# Patient Record
Sex: FEMALE | Race: WHITE | NOT HISPANIC OR LATINO | Employment: PART TIME | URBAN - METROPOLITAN AREA
[De-identification: names, ages, dates, MRNs, and addresses within clinical notes are randomized per-mention and may not be internally consistent; named-entity substitution may affect disease eponyms.]

---

## 2017-09-05 ENCOUNTER — OFFICE VISIT (OUTPATIENT)
Dept: OTOLARYNGOLOGY | Facility: CLINIC | Age: 27
End: 2017-09-05
Payer: COMMERCIAL

## 2017-09-05 VITALS
DIASTOLIC BLOOD PRESSURE: 81 MMHG | SYSTOLIC BLOOD PRESSURE: 125 MMHG | HEART RATE: 81 BPM | WEIGHT: 143.5 LBS | TEMPERATURE: 99 F

## 2017-09-05 DIAGNOSIS — J01.00 ACUTE NON-RECURRENT MAXILLARY SINUSITIS: Primary | ICD-10-CM

## 2017-09-05 PROCEDURE — 99999 PR PBB SHADOW E&M-NEW PATIENT-LVL III: CPT | Mod: PBBFAC,,, | Performed by: PHYSICIAN ASSISTANT

## 2017-09-05 PROCEDURE — 99203 OFFICE O/P NEW LOW 30 MIN: CPT | Mod: S$GLB,,, | Performed by: PHYSICIAN ASSISTANT

## 2017-09-05 RX ORDER — CEFDINIR 300 MG/1
300 CAPSULE ORAL 2 TIMES DAILY
Qty: 20 CAPSULE | Refills: 0 | Status: SHIPPED | OUTPATIENT
Start: 2017-09-05 | End: 2017-09-15

## 2017-09-05 RX ORDER — DOXYCYCLINE HYCLATE 100 MG/1
100 TABLET, DELAYED RELEASE ORAL 2 TIMES DAILY
COMMUNITY
End: 2017-10-20 | Stop reason: ALTCHOICE

## 2017-09-05 RX ORDER — FLUTICASONE PROPIONATE 50 MCG
2 SPRAY, SUSPENSION (ML) NASAL DAILY
COMMUNITY
End: 2018-06-22

## 2017-09-05 NOTE — PROGRESS NOTES
Subjective:       Patient ID: Winter Sood is a 27 y.o. female.    Chief Complaint: Sinus Problem    Patient is a very pleasant 27 year old female here to see me today for the first time for evaluation of sinus issues.  She reports one week of worsening right facial pain and pressure in her cheek.  She had a CT at her place of employment on Friday and was told her right maxillary sinus was full of fluid.  She's been on Doxycycline since that time with no improvement.  Today her right eye has watery drainage.  She's not having much nasal drainage; she tried Netipot last night with no results.  She denies change in sense of smell.  She's having headaches; gets some relief with Advil Sinus and she's been on Flonase for about 5 days.  She does not smoke.  Denies previous sinus surgery.  She usually gets one sinus infection a year.  She had fever today.      Review of Systems   Constitutional: Positive for fatigue and fever. Negative for activity change and appetite change.   HENT: Positive for congestion, sinus pain and sinus pressure. Negative for ear discharge, ear pain, hearing loss, nosebleeds, postnasal drip, rhinorrhea, sneezing, sore throat, tinnitus and trouble swallowing.    Eyes: Positive for discharge (watery, right eye).   Respiratory: Negative for cough, shortness of breath and wheezing.    Cardiovascular: Negative for chest pain and palpitations.   Gastrointestinal: Negative for diarrhea, nausea and vomiting.   Musculoskeletal: Negative for neck pain.   Allergic/Immunologic: Negative for environmental allergies and food allergies.   Neurological: Positive for headaches. Negative for dizziness and light-headedness.   Hematological: Negative for adenopathy.   Psychiatric/Behavioral: Negative for sleep disturbance.       Objective:      Physical Exam   Constitutional: She is oriented to person, place, and time. She appears well-developed and well-nourished. She is cooperative. No distress.   HENT:    Head: Normocephalic and atraumatic.   Right Ear: Hearing, tympanic membrane, external ear and ear canal normal. No middle ear effusion.   Left Ear: Hearing, tympanic membrane, external ear and ear canal normal.  No middle ear effusion.   Nose: Mucosal edema present. No rhinorrhea, nasal deformity or septal deviation. No epistaxis. Right sinus exhibits maxillary sinus tenderness and frontal sinus tenderness. Left sinus exhibits frontal sinus tenderness. Left sinus exhibits no maxillary sinus tenderness.   Mouth/Throat: Uvula is midline, oropharynx is clear and moist and mucous membranes are normal. Mucous membranes are not pale and not dry. No trismus in the jaw. Normal dentition. No uvula swelling. No oropharyngeal exudate or posterior oropharyngeal erythema. Tonsils are 2+ on the right. Tonsils are 2+ on the left. No tonsillar exudate.   Eyes: Conjunctivae, EOM and lids are normal. Pupils are equal, round, and reactive to light. Right eye exhibits no chemosis. Left eye exhibits no chemosis. Right conjunctiva is not injected. Left conjunctiva is not injected. No scleral icterus. Right eye exhibits normal extraocular motion and no nystagmus. Left eye exhibits normal extraocular motion and no nystagmus.   Neck: Trachea normal and phonation normal. No tracheal tenderness present. No tracheal deviation present. No thyroid mass and no thyromegaly present.   Cardiovascular: Intact distal pulses.    Pulmonary/Chest: Effort normal. No stridor. No respiratory distress.   Abdominal: She exhibits no distension.   Lymphadenopathy:        Head (right side): No submental, no submandibular, no preauricular and no posterior auricular adenopathy present.        Head (left side): No submental, no submandibular, no preauricular and no posterior auricular adenopathy present.     She has no cervical adenopathy.   Neurological: She is alert and oriented to person, place, and time. No cranial nerve deficit.   Skin: Skin is warm and dry.  No rash noted. No erythema.   Psychiatric: She has a normal mood and affect. Her behavior is normal.       Assessment:       1. Acute non-recurrent maxillary sinusitis        Plan:         Recommend Omnicef x 10 days.  Recommend she continue with Flonase daily; can add Mucinex BID and plain saline spray throughout the day.  RTC if no improvement after completing antibiotics.

## 2017-10-20 ENCOUNTER — OFFICE VISIT (OUTPATIENT)
Dept: OTOLARYNGOLOGY | Facility: CLINIC | Age: 27
End: 2017-10-20
Payer: COMMERCIAL

## 2017-10-20 VITALS
TEMPERATURE: 98 F | BODY MASS INDEX: 26.05 KG/M2 | HEIGHT: 62 IN | DIASTOLIC BLOOD PRESSURE: 82 MMHG | SYSTOLIC BLOOD PRESSURE: 127 MMHG | HEART RATE: 90 BPM | WEIGHT: 141.56 LBS

## 2017-10-20 DIAGNOSIS — J32.9 RECURRENT SINUSITIS: Primary | ICD-10-CM

## 2017-10-20 PROCEDURE — 99999 PR PBB SHADOW E&M-EST. PATIENT-LVL III: CPT | Mod: PBBFAC,,, | Performed by: PHYSICIAN ASSISTANT

## 2017-10-20 PROCEDURE — 99214 OFFICE O/P EST MOD 30 MIN: CPT | Mod: S$GLB,,, | Performed by: PHYSICIAN ASSISTANT

## 2017-10-20 RX ORDER — METRONIDAZOLE 500 MG/1
500 TABLET ORAL EVERY 8 HOURS
Qty: 42 TABLET | Refills: 0 | Status: SHIPPED | OUTPATIENT
Start: 2017-10-20 | End: 2017-11-03

## 2017-10-20 RX ORDER — LEVOFLOXACIN 500 MG/1
500 TABLET, FILM COATED ORAL DAILY
Qty: 21 TABLET | Refills: 0 | Status: SHIPPED | OUTPATIENT
Start: 2017-10-20 | End: 2017-11-10

## 2017-10-20 RX ORDER — PREDNISONE 20 MG/1
TABLET ORAL
Qty: 10 TABLET | Refills: 0 | Status: SHIPPED | OUTPATIENT
Start: 2017-10-20 | End: 2017-11-13 | Stop reason: ALTCHOICE

## 2017-10-20 NOTE — Clinical Note
Please call her to schedule CT sinus in 3 weeks (after finishes antibiotics) and then RTC with Dr. Manning to review CT

## 2017-10-20 NOTE — PROGRESS NOTES
Subjective:       Patient ID: Winter Sood is a 27 y.o. female.    Chief Complaint: Sinus Problem (fluid in sinuses)    Patient is a very pleasant 27 year old female here to see me today for evaluation of recurrent sinus issues.  She was first here with me last month with one week of worsening right facial pain and pressure in her cheek.  She had a CT at her place of employment and was told her right maxillary sinus was full of fluid.  She had been treated with Doxycycline when I saw her last.  She was then treated with Omnicef and later that week had a steroid injection.  She reports some improvement, but not sustained.  She again has two days of right facial pain and pressure; her teeth are hurting on the right and she has some ear pressure.  She's continued to use Flonase daily.  She's not tried Netipot again as it caused right eye to be watery for awhile afterwards.  She's not having much nasal drainage or postnasal drip.  She denies change in sense of smell.  She's continued to have headaches, almost daily;  gets some relief with Advil Sinus.  She does not smoke.  Denies previous sinus surgery.  She usually gets one sinus infection a year.  No recent fever.  She usually does not suffer with allergies - denies itchy eyes, congestion, or frequent sneezing.  She had another CT at her place of employment and is able to show me the image on her phone.  Unfortunately, the image was not saved in their system and she's unable to get a copy on CD for me.      Review of Systems   Constitutional: Positive for fatigue. Negative for activity change, appetite change and fever.   HENT: Positive for congestion, dental problem (right upper teeth hurt), ear pain (left this week), sinus pain and sinus pressure. Negative for ear discharge, hearing loss, nosebleeds, postnasal drip, rhinorrhea, sneezing, sore throat, tinnitus and trouble swallowing.    Eyes: Negative for discharge.   Respiratory: Negative for cough,  shortness of breath and wheezing.    Cardiovascular: Negative for chest pain and palpitations.   Gastrointestinal: Negative for diarrhea, nausea and vomiting.   Allergic/Immunologic: Negative for environmental allergies and food allergies.   Neurological: Positive for headaches. Negative for dizziness and light-headedness.   Hematological: Negative for adenopathy.   Psychiatric/Behavioral: Negative for sleep disturbance.       Objective:      Physical Exam   Constitutional: She is oriented to person, place, and time. She appears well-developed and well-nourished. She is cooperative. No distress.   HENT:   Head: Normocephalic and atraumatic.   Right Ear: Hearing, tympanic membrane, external ear and ear canal normal. No middle ear effusion.   Left Ear: Hearing, tympanic membrane, external ear and ear canal normal.  No middle ear effusion.   Nose: Mucosal edema present. No rhinorrhea, nasal deformity or septal deviation. No epistaxis. Right sinus exhibits maxillary sinus tenderness and frontal sinus tenderness. Left sinus exhibits frontal sinus tenderness. Left sinus exhibits no maxillary sinus tenderness.   Mouth/Throat: Uvula is midline, oropharynx is clear and moist and mucous membranes are normal. Mucous membranes are not pale and not dry. No trismus in the jaw. Normal dentition. No uvula swelling. No oropharyngeal exudate or posterior oropharyngeal erythema. Tonsils are 2+ on the right. Tonsils are 2+ on the left. No tonsillar exudate.   Eyes: Conjunctivae, EOM and lids are normal. Pupils are equal, round, and reactive to light. Right eye exhibits no chemosis. Left eye exhibits no chemosis. Right conjunctiva is not injected. Left conjunctiva is not injected. No scleral icterus. Right eye exhibits normal extraocular motion and no nystagmus. Left eye exhibits normal extraocular motion and no nystagmus.   Neck: Trachea normal and phonation normal. No tracheal tenderness present. No tracheal deviation present. No  thyroid mass and no thyromegaly present.   Cardiovascular: Intact distal pulses.    Pulmonary/Chest: Effort normal. No stridor. No respiratory distress.   Abdominal: She exhibits no distension.   Lymphadenopathy:        Head (right side): No submental, no submandibular, no preauricular and no posterior auricular adenopathy present.        Head (left side): No submental, no submandibular, no preauricular and no posterior auricular adenopathy present.     She has no cervical adenopathy.   Neurological: She is alert and oriented to person, place, and time. No cranial nerve deficit.   Skin: Skin is warm and dry. No rash noted. No erythema.   Psychiatric: She has a normal mood and affect. Her behavior is normal.                 Assessment:       1. Recurrent sinusitis        Plan:           Recommend Levaquin x 3 weeks and Flagyl x 2 weeks.  We reviewed risk of tendon rupture associated with Levaquin use.  She's not pregnant and is currently on her menstrual cycle.  Will add Prednisone taper as well.  Discussed risk of steroid use includes elevating blood sugar and difficulty with sleep.  She voiced understanding and agrees with treatment.  She's to continue Flonase daily.  Can start nasal saline spray.  Plan to get CT sinus in 3 weeks after completion of antibiotics.  Instructed her to call if no improvement in 7-10 days or with any worsening symptoms.

## 2017-11-10 ENCOUNTER — TELEPHONE (OUTPATIENT)
Dept: RADIOLOGY | Facility: HOSPITAL | Age: 27
End: 2017-11-10

## 2017-11-13 ENCOUNTER — HOSPITAL ENCOUNTER (OUTPATIENT)
Dept: RADIOLOGY | Facility: HOSPITAL | Age: 27
Discharge: HOME | End: 2017-11-13
Attending: PHYSICIAN ASSISTANT
Payer: COMMERCIAL

## 2017-11-13 ENCOUNTER — OFFICE VISIT (OUTPATIENT)
Dept: OTOLARYNGOLOGY | Facility: CLINIC | Age: 27
End: 2017-11-13
Payer: COMMERCIAL

## 2017-11-13 ENCOUNTER — TELEPHONE (OUTPATIENT)
Dept: OTOLARYNGOLOGY | Facility: CLINIC | Age: 27
End: 2017-11-13

## 2017-11-13 VITALS
DIASTOLIC BLOOD PRESSURE: 77 MMHG | BODY MASS INDEX: 26.05 KG/M2 | SYSTOLIC BLOOD PRESSURE: 115 MMHG | WEIGHT: 142.44 LBS | HEART RATE: 101 BPM | TEMPERATURE: 98 F

## 2017-11-13 DIAGNOSIS — J32.9 RECURRENT SINUSITIS: ICD-10-CM

## 2017-11-13 DIAGNOSIS — J32.0 CHRONIC RIGHT MAXILLARY SINUSITIS: ICD-10-CM

## 2017-11-13 DIAGNOSIS — J32.1 CHRONIC FRONTAL SINUSITIS: Primary | ICD-10-CM

## 2017-11-13 DIAGNOSIS — J32.2 CHRONIC ETHMOIDITIS: ICD-10-CM

## 2017-11-13 DIAGNOSIS — Z01.818 PRE-OP TESTING: ICD-10-CM

## 2017-11-13 PROCEDURE — 70486 CT MAXILLOFACIAL W/O DYE: CPT | Mod: TC,PO

## 2017-11-13 PROCEDURE — 31231 NASAL ENDOSCOPY DX: CPT | Mod: RT,S$GLB,, | Performed by: OTOLARYNGOLOGY

## 2017-11-13 PROCEDURE — 70486 CT MAXILLOFACIAL W/O DYE: CPT | Mod: 26,,, | Performed by: RADIOLOGY

## 2017-11-13 PROCEDURE — 99999 PR PBB SHADOW E&M-EST. PATIENT-LVL III: CPT | Mod: PBBFAC,,, | Performed by: OTOLARYNGOLOGY

## 2017-11-13 PROCEDURE — 99214 OFFICE O/P EST MOD 30 MIN: CPT | Mod: 25,S$GLB,, | Performed by: OTOLARYNGOLOGY

## 2017-11-13 NOTE — TELEPHONE ENCOUNTER
Patient will call to back to pick surgery date. Surgery CPT codes are 97659,[id number]. Consents signed along with instruction package provided to patient. Will needs to schedule labs upon return call back and surgery date selected. Surgery will be Right side only.

## 2017-11-13 NOTE — PROGRESS NOTES
Subjective:       Patient ID: Winter Sood is a 27 y.o. female.    Chief Complaint: Sinus Problem (fluid in sinuses)    Patient is a very pleasant 27 year old female here to see me today for evaluation of recurrent sinus issues.  She was first here with me last month with one week of worsening right facial pain and pressure in her cheek.  She had a CT at her place of employment and was told her right maxillary sinus was full of fluid.  She had been treated with Doxycycline when I saw her last.  She was then treated with Omnicef and later that week had a steroid injection.  She reports some improvement, but not sustained.  She again had  right facial pain and pressure; her teeth are hurting on the right and she has some ear pressure.  She's continued to use Flonase daily.    She's not having much nasal drainage or postnasal drip.  She denies change in sense of smell.  She's continued to have headaches, almost daily;  gets some relief with Advil Sinus.  She does not smoke.  Denies previous sinus surgery.  She usually gets one sinus infection a year.  No recent fever.  She usually does not suffer with allergies - denies itchy eyes, congestion, or frequent sneezing.  She had another CT at her place of employment and is able to show me the image on her phone.  She was treated with 3 weeks of levofloxacin and 2 weeks of flagyl.  She has had decrease in her symptoms but not resolution.  She continues to have congestion and now has drainage on the right.  She has less pain than before.         Review of Systems   Constitutional: Positive for fatigue. Negative for activity change, appetite change and fever.   HENT: Positive for congestion,. Negative for ear discharge, hearing loss, nosebleeds, postnasal drip, rhinorrhea, sneezing, sore throat, tinnitus and trouble swallowing.    Eyes: Negative for discharge.   Respiratory: Negative for cough, shortness of breath and wheezing.    Cardiovascular: Negative for chest  pain and palpitations.   Gastrointestinal: Negative for diarrhea, nausea and vomiting.   Allergic/Immunologic: Negative for environmental allergies and food allergies.   Neurological: Positive for headaches. Negative for dizziness and light-headedness.   Hematological: Negative for adenopathy.   Psychiatric/Behavioral: Negative for sleep disturbance.       Objective:      Physical Exam   Constitutional: She is oriented to person, place, and time. She appears well-developed and well-nourished. She is cooperative. No distress.   HENT:   Head: Normocephalic and atraumatic.   Right Ear: Hearing, tympanic membrane, external ear and ear canal normal. No middle ear effusion.   Left Ear: Hearing, tympanic membrane, external ear and ear canal normal.  No middle ear effusion.   Nose: Mucosal edema present. No rhinorrhea, nasal deformity or septal deviation. No epistaxis. Right sinus exhibits maxillary sinus tenderness and frontal sinus tenderness. Left sinus exhibits frontal sinus tenderness. Left sinus exhibits no maxillary sinus tenderness.   Mouth/Throat: Uvula is midline, oropharynx is clear and moist and mucous membranes are normal. Mucous membranes are not pale and not dry. No trismus in the jaw. Normal dentition. No uvula swelling. No oropharyngeal exudate or posterior oropharyngeal erythema. Tonsils are 2+ on the right. Tonsils are 2+ on the left. No tonsillar exudate.   Eyes: Conjunctivae, EOM and lids are normal. Pupils are equal, round, and reactive to light. Right eye exhibits no chemosis. Left eye exhibits no chemosis. Right conjunctiva is not injected. Left conjunctiva is not injected. No scleral icterus. Right eye exhibits normal extraocular motion and no nystagmus. Left eye exhibits normal extraocular motion and no nystagmus.   Neck: Trachea normal and phonation normal. No tracheal tenderness present. No tracheal deviation present. No thyroid mass and no thyromegaly present.   Cardiovascular: Intact distal  pulses.    Pulmonary/Chest: Effort normal. No stridor. No respiratory distress.   Abdominal: She exhibits no distension.   Lymphadenopathy:        Head (right side): No submental, no submandibular, no preauricular and no posterior auricular adenopathy present.        Head (left side): No submental, no submandibular, no preauricular and no posterior auricular adenopathy present.     She has no cervical adenopathy.   Neurological: She is alert and oriented to person, place, and time. No cranial nerve deficit.   Skin: Skin is warm and dry. No rash noted. No erythema.   Psychiatric: She has a normal mood and affect. Her behavior is normal.     No results found for this or any previous visit.  Results for orders placed during the hospital encounter of 11/13/17   CT BitWine Sinuses without    Narrative Comparisons: None    History: Recurrent RIGHT maxillary sinusitis, chronic sinusitis    Findings: Axial CT of the sinuses was performed per navigational protocol for preoperative planning purposes.    RIGHT frontal, ethmoid and bilateral maxillary sinus disease noted.  The LEFT a frontal, maxillary and sphenoid sinuses are clear.  Smooth deviation of the medial wall the RIGHT orbit to 4 CM midline noted.  Appearances suggest possible sequela from previous trauma versus normal variant.  There is opacification of multiple adjacent to ethmoid air cells and partial opacification of the RIGHT frontal sinus.  Nodular or polypoid mucosal thickening noted involving the inferior margins of the maxillary sinuses.  No sinus expansion or bony erosion.  RIGHT OMU appears obstructed.  The LEFT OMU is clear.  Bony septum is remarkable for minimal rightward bowing and a small right-sided bony spur noted.    Impression  As above.      Electronically signed by: WAYNE MOE III, MD  Date:     11/13/17  Time:    14:55                  Assessment:        Chronic maxillary, ethmoid and frontal sinusitis       Plan:           Winter has  chronic sinusitis without polyps and has been on maximal medical therapy as outlined in the HPI.  My recommendation is for endoscopic sinus surgery including right frontal, maxillary and ethmoid.  We discussed the need for postoperative debridements as well as chronic allergy management postoperatively. We discussed at length the risk of sinus surgery including, but not limited to, recurrence of disease, bleeding, infection, septal perforation, tooth or cheek numbness, vision changes, orbital injury, CSF leak and changes in smell.  The patient had opportunity to ask questions and I answered all of them to their satisfaction.  We will proceed as planned.          Patient: Winter Sood [id number], :1990  Procedure date:2017  Patient's medications, allergies, past medical, surgical, social and family histories were reviewed and updated as appropriate.  Chief Complaint:  Follow-up (3 week rtc review ct scan per tim )    HPI:  Winter is a 27 y.o. female with the history of present illness as discussed in the clinic note from today.    Procedure: Risks, benefits, and alternatives of the procedure were discussed with the patient, and the patient consented to the nasal endoscopy.  The nasal cavity was sprayed with a topical decongestant and anesthetic (if needed). The endoscope was passed into each nostril and each nasal cavity was visualized.  On each side the nasal cavity, sinuses (if open), turbinates, and septum were examined with the findings described below. At the end of the examination, the scope was removed. The patient tolerated the procedure well with no complications.       Endoscopic Sinonasal Exam Findings:  -     The right side has moderate edema  -     The left side has normal mucosa  -     Nasal secretions: purulent secretions on the right  -     Nasal septum: no significant deviation or perforation appreciated   -     Inferior turbinate: hypertrophy or edema (Moderate) on the  right  -     Middle turbinate: severely lateralized and appears scarred onto the lamina on the right  -     Other findings: No mass or obstructive lesion    Assessment & Plan:  - see today's clinic note

## 2017-11-13 NOTE — PATIENT INSTRUCTIONS
CPT 27727, 63481, [id number]    PLEASE PERFORM SINUS RINSES 5-7 TIMES DAILY UNTIL YOUR FIRST POSTOPERATIVE VISIT.          DIRECTIONS FOR SINUS SALINE RINSE To see demonstration: Enter [email or web address]/[email or web address] into the browser address box, or go to You tube, and under the search box, enter sinus rinse. Click on [facility] Sinus Rinse Video    Step 1    Step 1 Please wash your hands. Fill the clean bottle with the designated volume of warm distilled water, filtered water or previously boiled water. You may warm the water in a microwave but we recommend that you warm it in increments of five seconds. This is to avoid excessive heating of the water and damage to the device or scalding your nasal passage.    Step 2    Step 2 Cut the SINUS RINSE mixture packet at the corner and pour its contents into the bottle. Tighten the cap & tube on the bottle securely, place one finger over the tip of the cap and shake the bottle gently to dissolve the mixture.      Step 3    Step 3 Standing in front of a sink, bend forward to your comfort level and tilt your head down. Keeping your mouth open without holding your breath, place cap snugly against your nasal passage and SQUEEZE BOTTLE GENTLY until the solution starts draining from the OPPOSITE nasal passage or from your mouth. Keep squeezing the bottle GENTLY until at least 1/4 to 1/2 (60 to 120 mL) of the bottle is used for a proper rinse. Do not swallow the solution.    Step 4    Blow your nose gently, without pinching your nose completely because this will apply pressure on the    eardrums. If tolerable, sniff in any residual solution remaining in the nasal passage once or twice prior to    blowing your nose as this may clean out the posterior nasopharyngeal area (the area at the back of your    nasal passage). Some solution will reach the back of the throat, so please spit it out. To help improve    drainage of any residual solution, blow your nose gently while  tilting your head to the opposite side of    the nasal passage that you just rinsed.    Step 5    Now repeat steps 3 & 4 for your other nasal passage.    Step 6 Air dry the Sinus Rinse bottle, cap, and tube on a clean paper towel, a glass plate to store the bottle cap and tube. If there is any solution leftover, please discard it. We recommend you make a fresh solution each time you rinse. Rinse 5 times each day, OR as directed by your physician. Warnings: DO NOT RINSE IF NASAL PASSAGE IS COMPLETELY BLOCKED OR IF YOU HAVE AN EAR INFECTION OR BLOCKED EARS. If you have had ear surgery, please contact your physician prior to irrigation. If you experience any pressure in your ears, stop the rinse and get further directions from your physician or contact our office during regular business hours. To avoid any ear discomfort: Heat the solution to lukewarm, do not use hot, boiling or cold water. Keep your mouth open. Do not hold your breath and if possible make the sound DOUG....DOUG... Make sure to take the position as shown. Gently squeeze 1/4 of the bottle at a time (60mL / 2 ounces). Stop the rinse if you feel any solution sensation near the ears. You may rinse with a partially blocked nasal passage. Please do not use for any other purposes. Please rinse at least ONE HOUR PRIOR to bedtime, in order to avoid any residual solution dripping in the throat.    >> Before using the SINUS RINSE kit, please inspect the cap, tube and bottle carefully for wear and    tear. If any of the components appear discolored or cracked, please contact [facility] to obtain a    replacement. You must follow the cleaning instructions provided in this brochure or cleaning instruction    card prior to each use.    >> The SINUS RINSE bottle and mixture are to be used only for nasalirrigation. Do not use for any other    purposes.    >> We recommend that you use the rinse ONE HOUR PRIOR to bedtime in order to avoid any residual    solution dripping  in the throat.    Tips to avoid ear discomfort while rinsing    If you have had ear surgery, please contact your physician prior to irrigation. Do not use if you have an    ear infection or blocked ears. Rinse with lukewarm water. Keep your mouth open. Do not hold your    breath while rinsing. While rinsing, make sure to tilt your. Gently squeeze the bottle while rinsing; do    not squeeze the bottle very forcefully. Stop the rinse if you feel a sensation of fluid near your ears.    Tips to avoid unexpected drainage after rinsing    In rare situations, especially if you have had sinus surgery, the saline solution can pool in the sinus    cavities and nasal passages and then drip from your nostrils hours after rinsing. To avoid this harmless    but annoying inconvenience, take one extra step after rinsing: lean forward, tilt your head sideways and    gently blow your nose. Then, tilt your head to the other side and blow again. You may need to repeat    this several times. This will help rid your nasal passages of any excess mucus and remaining saline    solution. If you find yourself experiencing delayed drainage often, do not rinse right before leaving your    house or going to bed.              ENDOSCOPIC SINUS SURGERY POST-OP INSTRUCTIONS    Rarely, is nasal packing (absorbent bandage) required (less than 5% of the time). This will be remove before you go home, or in some cases, on your first post-up appointment.    Slight trickling of blood in the back of your throat, or on your drip pad in front of your nose, and/or crusting of secretions for the first few days, is to be expected. You should change your drip pad periodically during the day as needed. This may be necessary for the first 3-5 days after surgery. You may also clean the hair-baring area of the nose daily, as needed, using a Q-tip and hydrogen peroxide. Keep your fingers out of your nose.    Diet: You may experience nausea after surgery. Avoid heavy  meals on that day. Wait 24 hours to gradually resume your normal diet. Drink plenty of fluids to maintain hydration (6-8 glasses daily minimum). Avoid alcohol or caffeine the first week after surgery.    Restricted Activities: the first week after surgery, your sinuses will feel very full and congested, this is due to the accumulation of dried blood and/or mucus crust. Do not plan to blow your nose, bend over or lift heavy objects (over 10 lbs) for the first week after surgery. This may result in a nose bleed. Rest and do not exert or overheat yourself with exercise or other physical activity.    Post-Operative pain: pain medication (Percocet or Acetaminophen with codeine) and/or anti-inflammatory medication (prednisone) may be ordered for significant post-operative pain, (FOR HEADACHES). You can still take extra-strength acetaminophen if the pain is not too uncomfortable, to avoid the occasional side-affects of nausea, constipation, or grogginess, associated with all narcotics. Use stool softener such as: dulcolax or Miralax to prevent constipation. Do not drive or handle heavy machinery while on narcotics. Throat discomfort is not too uncommon after endotracheal intubation during general anesthesia. This should resolve on it's own within 5 days. Throat lozenges or gargles tend to soothe the discomfort. Plane travel is allowed after your first post-op visit.    Do not take aspirin or anti-coagulant therapy 7-10 days after surgery unless otherwise indicated by your surgeon at time of discharge. If you are on Coumadin you will likely restart your normal dosage 24 hours after surgery. However, check with your doctor first.    Post-operative Office Visits: The first visit will be 7-10 days after surgery. At this time the physician will clean your sinus cavity by removing dried blood and/or mucus crusting to promote healing, to minimize the chance for secondary bacterial infection, and relieves congestion and/or  headaches. It is suggested that you take a couple of narcotics pills 1 hour prior to your first post op appointment. This is especially important for your first debridement, when your internal nose is still slightly swollen and tender.    If indicated by the physician, patient may start nasal irrigations, such as Sinus Rinse, or resume any other previous treatment.    Signs and symptoms that MUST be reported to the physician/nurse immediately by [phone]. *Fever over 102 degree F. *Persistent bleeding with more that ¼ cup of bright red blood within a short period of time (half hour). *Severe pain unrelieved by prescribed medication. *Mental or visual changes (confusion, slurred speech, double- vision, visual loss).    * If you experience difficulty breathing, shortness of breath, or severe bleeding, call 911 or go to the nearest emergency room.

## 2018-06-04 ENCOUNTER — TELEPHONE (OUTPATIENT)
Dept: OTOLARYNGOLOGY | Facility: CLINIC | Age: 28
End: 2018-06-04

## 2018-06-04 DIAGNOSIS — J32.1 CHRONIC FRONTAL SINUSITIS: Primary | ICD-10-CM

## 2018-06-04 DIAGNOSIS — J32.2 CHRONIC ETHMOIDITIS: ICD-10-CM

## 2018-06-04 DIAGNOSIS — J32.0 CHRONIC RIGHT MAXILLARY SINUSITIS: ICD-10-CM

## 2018-06-04 NOTE — TELEPHONE ENCOUNTER
Attempted return call back, left detailed message advising of so and to return our call at her convenience. Patient has been consented for FESS right side. Labs waiting to be scheduled upon choosing date for surgery.

## 2018-06-04 NOTE — TELEPHONE ENCOUNTER
Spoke with patient to schedule FESS right side for 6/13/18. Scheduled labs for 6/12/18 along with post op follow up for 6/22/18 at 12:00 pm. Instructions and location for surgery verbally understood from patient.

## 2018-06-04 NOTE — TELEPHONE ENCOUNTER
----- Message from Payton Carter sent at 6/4/2018  2:17 PM CDT -----  Contact: self [phone]  Would like to consult with nurse regarding scheduling surgery. Please call back at [phone].  Thx,

## 2018-06-06 ENCOUNTER — TELEPHONE (OUTPATIENT)
Dept: OTOLARYNGOLOGY | Facility: CLINIC | Age: 28
End: 2018-06-06

## 2018-06-06 ENCOUNTER — PATIENT MESSAGE (OUTPATIENT)
Dept: SURGERY | Facility: HOSPITAL | Age: 28
End: 2018-06-06

## 2018-06-06 DIAGNOSIS — Z01.818 PRE-OP TESTING: Primary | ICD-10-CM

## 2018-06-07 ENCOUNTER — TELEPHONE (OUTPATIENT)
Dept: OTOLARYNGOLOGY | Facility: CLINIC | Age: 28
End: 2018-06-07

## 2018-06-08 ENCOUNTER — ANESTHESIA EVENT (OUTPATIENT)
Dept: SURGERY | Facility: HOSPITAL | Age: 28
End: 2018-06-08
Payer: COMMERCIAL

## 2018-06-11 NOTE — TELEPHONE ENCOUNTER
Spoke with patient to assure that we received the images sent in email into my chart. Patient wishes to know if anything corrective will be done in the surgery and wants to know if any surgery changes will be made due to recent CT scan.

## 2018-06-11 NOTE — PRE ADMISSION SCREENING
Pre op instructions reviewed with patient per phone:    To confirm, Your surgeon has instructed you:  Surgery is scheduled 06/13/18at per MD.      [facility] by per MD.         INSTRUCTIONS IMPORTANT!!!  ¨ Do not eat, drink, or smoke after 12 midnight-including water. OK to brush teeth, no gum, candy or mints!    ¨ Take only these medicines with a small swallow of water-morning of surgery.  N/A      ____  Do not wear makeup, including mascara.  ____  No powder, lotions or creams to surgical area.  ____  Please remove all jewelry, including piercings and leave at home.  ____  No money or valuables needed. Please leave at home.  ____  Please bring identification and insurance information to hospital.  ____  If going home the same day, arrange for a ride home. You will not be able to   drive if Anesthesia was used.  ____  Children, under 12 years old, must remain in the waiting room with an adult.  They are not allowed in patient areas.  ____  Wear loose fitting clothing. Allow for dressings, bandages.  ____  Stop Aspirin, Ibuprofen, Motrin and Aleve at least 5-7 days before surgery, unless otherwise instructed by your doctor, or the nurse.   You MAY use Tylenol/acetaminophen until day of surgery.  ____  If you take diabetic medication, do not take am of surgery unless instructed by   Doctor.  ____ Stop taking any Fish Oil supplement or any Vitamins that contain Vitamin E at least 5 days prior to surgery.          Bathing Instructions-- The night before surgery and the morning prior to coming to the hospital:   -Do not shave the surgical area.   -Shower and wash your hair and body as usual with anti-bacterial  soap and shampoo.   -Rinse your hair and body completely.   -Use one packet of hibiclens to wash the surgical site (using your hand) gently for 5 minutes.  Do not scrub you skin too hard.   -Do not use hibiclens on your head, face, or genitals.   -Do  not wash with anti-bacterial soap after you use the hibiclens.   -Rinse your body thoroughly.   -Dry with clean, soft towel.  Do not use lotion, cream, deodorant, or powders on   the surgical site.    Use antibacterial soap in place of hibiclens if your surgery is on the head, face or genitals.         Surgical Site Infection    Prevention of surgical site infections:     -Keep incisions clean and dry.   -Do not soak/submerge incisions in water until completely healed.   -Do not apply lotions, powders, creams, or deodorants to site.   -Always make sure hands are cleaned with antibacterial soap/ alcohol-based   prior to touching the surgical site.  (This includes doctors, nurses, staff, and yourself.)    Signs and symptoms:   -Redness and pain around the area where you had surgery   -Drainage of cloudy fluid from your surgical wound   -Fever over 100.4  I have read or had read and explained to me, and understand the above information.

## 2018-06-12 ENCOUNTER — LAB VISIT (OUTPATIENT)
Dept: LAB | Facility: HOSPITAL | Age: 28
End: 2018-06-12
Attending: OTOLARYNGOLOGY
Payer: COMMERCIAL

## 2018-06-12 ENCOUNTER — TELEPHONE (OUTPATIENT)
Dept: OTOLARYNGOLOGY | Facility: CLINIC | Age: 28
End: 2018-06-12

## 2018-06-12 DIAGNOSIS — Z01.818 PRE-OP TESTING: ICD-10-CM

## 2018-06-12 LAB
BASOPHILS # BLD AUTO: 0.05 K/UL
BASOPHILS NFR BLD: 1 %
DIFFERENTIAL METHOD: ABNORMAL
EOSINOPHIL # BLD AUTO: 0.1 K/UL
EOSINOPHIL NFR BLD: 2.3 %
ERYTHROCYTE [DISTWIDTH] IN BLOOD BY AUTOMATED COUNT: 14 %
HCG INTACT+B SERPL-ACNC: <1.2 MIU/ML
HCT VFR BLD AUTO: 43.8 %
HGB BLD-MCNC: 14.2 G/DL
IMM GRANULOCYTES # BLD AUTO: 0.01 K/UL
IMM GRANULOCYTES NFR BLD AUTO: 0.2 %
LYMPHOCYTES # BLD AUTO: 2.6 K/UL
LYMPHOCYTES NFR BLD: 50.2 %
MCH RBC QN AUTO: 28.5 PG
MCHC RBC AUTO-ENTMCNC: 32.4 G/DL
MCV RBC AUTO: 88 FL
MONOCYTES # BLD AUTO: 0.4 K/UL
MONOCYTES NFR BLD: 6.9 %
NEUTROPHILS # BLD AUTO: 2.1 K/UL
NEUTROPHILS NFR BLD: 39.4 %
NRBC BLD-RTO: 0 /100 WBC
PLATELET # BLD AUTO: 251 K/UL
PMV BLD AUTO: 11.8 FL
RBC # BLD AUTO: 4.99 M/UL
WBC # BLD AUTO: 5.22 K/UL

## 2018-06-12 PROCEDURE — 84702 CHORIONIC GONADOTROPIN TEST: CPT

## 2018-06-12 PROCEDURE — 36415 COLL VENOUS BLD VENIPUNCTURE: CPT | Mod: PO

## 2018-06-12 PROCEDURE — 85025 COMPLETE CBC W/AUTO DIFF WBC: CPT

## 2018-06-12 NOTE — ANESTHESIA PREPROCEDURE EVALUATION
06/12/2018  Winter Sood is a 28 y.o., female.    Anesthesia Evaluation    I have reviewed the Patient Summary Reports.    I have reviewed the Nursing Notes.   I have reviewed the Medications.     Review of Systems  Anesthesia Hx:  Denies Family Hx of Anesthesia complications.   Denies Personal Hx of Anesthesia complications.   Social:  Non-Smoker    Cardiovascular:  Cardiovascular Normal     Pulmonary:  Pulmonary Normal    Neurological:   Headaches    Endocrine:  Endocrine Normal        Physical Exam  General:  Well nourished    Airway/Jaw/Neck:  Airway Findings: Mouth Opening: Normal Mallampati: I       Chest/Lungs:  Chest/Lungs Findings: Normal Respiratory Rate     Heart/Vascular:  Heart Findings: Normal            Anesthesia Plan  Type of Anesthesia, risks & benefits discussed:  Anesthesia Type:  general  Patient's Preference:   Intra-op Monitoring Plan:   Intra-op Monitoring Plan Comments:   Post Op Pain Control Plan: multimodal analgesia  Post Op Pain Control Plan Comments:   Induction:   IV  Beta Blocker:  Patient is not currently on a Beta-Blocker (No further documentation required).       Informed Consent:    ASA Score: 1     Day of Surgery Review of History & Physical: I have interviewed and examined the patient. I have reviewed the patient's H&P dated:  There are no significant changes.          Ready For Surgery From Anesthesia Perspective.

## 2018-06-12 NOTE — TELEPHONE ENCOUNTER
Surgery arrival time confirmed of 7:00 am on 6/13/18 with Dr. Manning with verbal understanding from patient.

## 2018-06-13 ENCOUNTER — SURGERY (OUTPATIENT)
Age: 28
End: 2018-06-13

## 2018-06-13 ENCOUNTER — ANESTHESIA (OUTPATIENT)
Dept: SURGERY | Facility: HOSPITAL | Age: 28
End: 2018-06-13
Payer: COMMERCIAL

## 2018-06-13 ENCOUNTER — HOSPITAL ENCOUNTER (OUTPATIENT)
Facility: HOSPITAL | Age: 28
Discharge: HOME | End: 2018-06-13
Attending: OTOLARYNGOLOGY | Admitting: OTOLARYNGOLOGY
Payer: COMMERCIAL

## 2018-06-13 DIAGNOSIS — J32.0 CHRONIC RIGHT MAXILLARY SINUSITIS: Primary | ICD-10-CM

## 2018-06-13 DIAGNOSIS — J32.1 CHRONIC FRONTAL SINUSITIS: ICD-10-CM

## 2018-06-13 DIAGNOSIS — J32.2 CHRONIC ETHMOIDITIS: ICD-10-CM

## 2018-06-13 PROBLEM — J32.3 CHRONIC SPHENOIDAL SINUSITIS: Status: ACTIVE | Noted: 2018-06-13

## 2018-06-13 LAB
B-HCG UR QL: NEGATIVE
CTP QC/QA: YES

## 2018-06-13 PROCEDURE — 88305 TISSUE EXAM BY PATHOLOGIST: CPT | Mod: 26,,, | Performed by: PATHOLOGY

## 2018-06-13 PROCEDURE — 25000003 PHARM REV CODE 250: Performed by: OTOLARYNGOLOGY

## 2018-06-13 PROCEDURE — 37000009 HC ANESTHESIA EA ADD 15 MINS: Performed by: OTOLARYNGOLOGY

## 2018-06-13 PROCEDURE — 81025 URINE PREGNANCY TEST: CPT | Performed by: OTOLARYNGOLOGY

## 2018-06-13 PROCEDURE — 25000003 PHARM REV CODE 250: Performed by: NURSE ANESTHETIST, CERTIFIED REGISTERED

## 2018-06-13 PROCEDURE — 63600175 PHARM REV CODE 636 W HCPCS: Performed by: NURSE ANESTHETIST, CERTIFIED REGISTERED

## 2018-06-13 PROCEDURE — 31257 NSL/SINS NDSC TOT W/SPHENDT: CPT | Mod: RT,,, | Performed by: OTOLARYNGOLOGY

## 2018-06-13 PROCEDURE — 87076 CULTURE ANAEROBE IDENT EACH: CPT

## 2018-06-13 PROCEDURE — 36000709 HC OR TIME LEV III EA ADD 15 MIN: Performed by: OTOLARYNGOLOGY

## 2018-06-13 PROCEDURE — 71000015 HC POSTOP RECOV 1ST HR: Performed by: OTOLARYNGOLOGY

## 2018-06-13 PROCEDURE — 87070 CULTURE OTHR SPECIMN AEROBIC: CPT

## 2018-06-13 PROCEDURE — 25000003 PHARM REV CODE 250: Performed by: ANESTHESIOLOGY

## 2018-06-13 PROCEDURE — 36000708 HC OR TIME LEV III 1ST 15 MIN: Performed by: OTOLARYNGOLOGY

## 2018-06-13 PROCEDURE — 37000008 HC ANESTHESIA 1ST 15 MINUTES: Performed by: OTOLARYNGOLOGY

## 2018-06-13 PROCEDURE — 71000033 HC RECOVERY, INTIAL HOUR: Performed by: OTOLARYNGOLOGY

## 2018-06-13 PROCEDURE — 87075 CULTR BACTERIA EXCEPT BLOOD: CPT

## 2018-06-13 PROCEDURE — 27201423 OPTIME MED/SURG SUP & DEVICES STERILE SUPPLY: Performed by: OTOLARYNGOLOGY

## 2018-06-13 PROCEDURE — 88305 TISSUE EXAM BY PATHOLOGIST: CPT | Performed by: PATHOLOGY

## 2018-06-13 PROCEDURE — 31256 EXPLORATION MAXILLARY SINUS: CPT | Mod: 51,RT,, | Performed by: OTOLARYNGOLOGY

## 2018-06-13 PROCEDURE — 31276 NSL/SINS NDSC FRNT TISS RMVL: CPT | Mod: 51,RT,, | Performed by: OTOLARYNGOLOGY

## 2018-06-13 RX ORDER — OXYCODONE AND ACETAMINOPHEN 5; 325 MG/1; MG/1
TABLET ORAL
Qty: 45 TABLET | Refills: 0 | Status: SHIPPED | OUTPATIENT
Start: 2018-06-13 | End: 2018-07-23

## 2018-06-13 RX ORDER — OXYMETAZOLINE HCL 0.05 %
SPRAY, NON-AEROSOL (ML) NASAL
Status: DISCONTINUED | OUTPATIENT
Start: 2018-06-13 | End: 2018-06-13 | Stop reason: HOSPADM

## 2018-06-13 RX ORDER — SUCCINYLCHOLINE CHLORIDE 20 MG/ML
INJECTION INTRAMUSCULAR; INTRAVENOUS
Status: DISCONTINUED | OUTPATIENT
Start: 2018-06-13 | End: 2018-06-13

## 2018-06-13 RX ORDER — MIDAZOLAM HYDROCHLORIDE 1 MG/ML
INJECTION, SOLUTION INTRAMUSCULAR; INTRAVENOUS
Status: DISCONTINUED | OUTPATIENT
Start: 2018-06-13 | End: 2018-06-13

## 2018-06-13 RX ORDER — ONDANSETRON 4 MG/1
4 TABLET, FILM COATED ORAL EVERY 8 HOURS PRN
Qty: 30 TABLET | Refills: 0 | Status: SHIPPED | OUTPATIENT
Start: 2018-06-13 | End: 2018-07-23

## 2018-06-13 RX ORDER — SODIUM CHLORIDE, SODIUM LACTATE, POTASSIUM CHLORIDE, CALCIUM CHLORIDE 600; 310; 30; 20 MG/100ML; MG/100ML; MG/100ML; MG/100ML
INJECTION, SOLUTION INTRAVENOUS CONTINUOUS
Status: DISCONTINUED | OUTPATIENT
Start: 2018-06-13 | End: 2018-06-13 | Stop reason: HOSPADM

## 2018-06-13 RX ORDER — SODIUM CHLORIDE 0.9 % (FLUSH) 0.9 %
3 SYRINGE (ML) INJECTION
Status: DISCONTINUED | OUTPATIENT
Start: 2018-06-13 | End: 2018-06-13 | Stop reason: HOSPADM

## 2018-06-13 RX ORDER — OXYCODONE HYDROCHLORIDE 5 MG/1
5 TABLET ORAL
Status: DISCONTINUED | OUTPATIENT
Start: 2018-06-13 | End: 2018-06-13 | Stop reason: HOSPADM

## 2018-06-13 RX ORDER — MEPERIDINE HYDROCHLORIDE 50 MG/ML
12.5 INJECTION INTRAMUSCULAR; INTRAVENOUS; SUBCUTANEOUS ONCE AS NEEDED
Status: DISCONTINUED | OUTPATIENT
Start: 2018-06-13 | End: 2018-06-13 | Stop reason: HOSPADM

## 2018-06-13 RX ORDER — ROCURONIUM BROMIDE 10 MG/ML
INJECTION, SOLUTION INTRAVENOUS
Status: DISCONTINUED | OUTPATIENT
Start: 2018-06-13 | End: 2018-06-13

## 2018-06-13 RX ORDER — DEXAMETHASONE SODIUM PHOSPHATE 4 MG/ML
INJECTION, SOLUTION INTRA-ARTICULAR; INTRALESIONAL; INTRAMUSCULAR; INTRAVENOUS; SOFT TISSUE
Status: DISCONTINUED | OUTPATIENT
Start: 2018-06-13 | End: 2018-06-13

## 2018-06-13 RX ORDER — ONDANSETRON 2 MG/ML
INJECTION INTRAMUSCULAR; INTRAVENOUS
Status: DISCONTINUED | OUTPATIENT
Start: 2018-06-13 | End: 2018-06-13

## 2018-06-13 RX ORDER — MORPHINE SULFATE 2 MG/ML
2 INJECTION, SOLUTION INTRAMUSCULAR; INTRAVENOUS EVERY 5 MIN PRN
Status: DISCONTINUED | OUTPATIENT
Start: 2018-06-13 | End: 2018-06-13 | Stop reason: HOSPADM

## 2018-06-13 RX ORDER — PROPOFOL 10 MG/ML
VIAL (ML) INTRAVENOUS
Status: DISCONTINUED | OUTPATIENT
Start: 2018-06-13 | End: 2018-06-13

## 2018-06-13 RX ORDER — SODIUM CHLORIDE 0.9 % (FLUSH) 0.9 %
3 SYRINGE (ML) INJECTION EVERY 8 HOURS
Status: DISCONTINUED | OUTPATIENT
Start: 2018-06-13 | End: 2018-06-13 | Stop reason: HOSPADM

## 2018-06-13 RX ORDER — METOCLOPRAMIDE HYDROCHLORIDE 5 MG/ML
10 INJECTION INTRAMUSCULAR; INTRAVENOUS EVERY 10 MIN PRN
Status: DISCONTINUED | OUTPATIENT
Start: 2018-06-13 | End: 2018-06-13 | Stop reason: HOSPADM

## 2018-06-13 RX ORDER — PROPOFOL 10 MG/ML
VIAL (ML) INTRAVENOUS CONTINUOUS PRN
Status: DISCONTINUED | OUTPATIENT
Start: 2018-06-13 | End: 2018-06-13

## 2018-06-13 RX ORDER — ACETAMINOPHEN 10 MG/ML
INJECTION, SOLUTION INTRAVENOUS
Status: DISCONTINUED | OUTPATIENT
Start: 2018-06-13 | End: 2018-06-13

## 2018-06-13 RX ORDER — LIDOCAINE HCL/PF 100 MG/5ML
SYRINGE (ML) INTRAVENOUS
Status: DISCONTINUED | OUTPATIENT
Start: 2018-06-13 | End: 2018-06-13

## 2018-06-13 RX ORDER — FENTANYL CITRATE 50 UG/ML
INJECTION, SOLUTION INTRAMUSCULAR; INTRAVENOUS
Status: DISCONTINUED | OUTPATIENT
Start: 2018-06-13 | End: 2018-06-13

## 2018-06-13 RX ADMIN — OXYCODONE HYDROCHLORIDE 5 MG: 5 TABLET ORAL at 11:06

## 2018-06-13 RX ADMIN — DEXAMETHASONE SODIUM PHOSPHATE 4 MG: 4 INJECTION, SOLUTION INTRA-ARTICULAR; INTRALESIONAL; INTRAMUSCULAR; INTRAVENOUS; SOFT TISSUE at 10:06

## 2018-06-13 RX ADMIN — PROPOFOL 150 MG: 10 INJECTION, EMULSION INTRAVENOUS at 08:06

## 2018-06-13 RX ADMIN — ACETAMINOPHEN 1000 MG: 10 INJECTION, SOLUTION INTRAVENOUS at 10:06

## 2018-06-13 RX ADMIN — OXYMETAZOLINE HYDROCHLORIDE 15 ML: 5 SPRAY NASAL at 08:06

## 2018-06-13 RX ADMIN — FENTANYL CITRATE 50 MCG: 50 INJECTION, SOLUTION INTRAMUSCULAR; INTRAVENOUS at 09:06

## 2018-06-13 RX ADMIN — SODIUM CHLORIDE, SODIUM LACTATE, POTASSIUM CHLORIDE, AND CALCIUM CHLORIDE: 600; 310; 30; 20 INJECTION, SOLUTION INTRAVENOUS at 08:06

## 2018-06-13 RX ADMIN — SUCCINYLCHOLINE CHLORIDE 140 MG: 20 INJECTION, SOLUTION INTRAMUSCULAR; INTRAVENOUS at 08:06

## 2018-06-13 RX ADMIN — ROCURONIUM BROMIDE 5 MG: 10 INJECTION, SOLUTION INTRAVENOUS at 08:06

## 2018-06-13 RX ADMIN — LIDOCAINE HYDROCHLORIDE 30 ML: 10; .005 INJECTION, SOLUTION EPIDURAL; INFILTRATION; INTRACAUDAL; PERINEURAL at 08:06

## 2018-06-13 RX ADMIN — LIDOCAINE HYDROCHLORIDE 100 MG: 20 INJECTION, SOLUTION INTRAVENOUS at 08:06

## 2018-06-13 RX ADMIN — MIDAZOLAM 2 MG: 1 INJECTION INTRAMUSCULAR; INTRAVENOUS at 08:06

## 2018-06-13 RX ADMIN — PROPOFOL 75 MCG/KG/MIN: 10 INJECTION, EMULSION INTRAVENOUS at 08:06

## 2018-06-13 RX ADMIN — ONDANSETRON 4 MG: 2 INJECTION, SOLUTION INTRAMUSCULAR; INTRAVENOUS at 10:06

## 2018-06-13 RX ADMIN — FENTANYL CITRATE 100 MCG: 50 INJECTION, SOLUTION INTRAMUSCULAR; INTRAVENOUS at 08:06

## 2018-06-13 NOTE — H&P
Patient ID: Winter Sood is a 27 y.o. female.     Chief Complaint: Sinus Problem (fluid in sinuses)     Patient is a very pleasant 27 year old female here to see me today for evaluation of recurrent sinus issues.  She was first here with me last month with one week of worsening right facial pain and pressure in her cheek.  She had a CT at her place of employment and was told her right maxillary sinus was full of fluid.  She had been treated with Doxycycline when I saw her last.  She was then treated with Omnicef and later that week had a steroid injection.  She reports some improvement, but not sustained.  She again had  right facial pain and pressure; her teeth are hurting on the right and she has some ear pressure.  She's continued to use Flonase daily.    She's not having much nasal drainage or postnasal drip.  She denies change in sense of smell.  She's continued to have headaches, almost daily;  gets some relief with Advil Sinus.  She does not smoke.  Denies previous sinus surgery.  She usually gets one sinus infection a year.  No recent fever.  She usually does not suffer with allergies - denies itchy eyes, congestion, or frequent sneezing.  She had another CT at her place of employment and is able to show me the image on her phone.  She was treated with 3 weeks of levofloxacin and 2 weeks of flagyl.  She has had decrease in her symptoms but not resolution.  She continues to have congestion and now has drainage on the right.  She has less pain than before.         Review of Systems   Constitutional: Positive for fatigue. Negative for activity change, appetite change and fever.   HENT: Positive for congestion,. Negative for ear discharge, hearing loss, nosebleeds, postnasal drip, rhinorrhea, sneezing, sore throat, tinnitus and trouble swallowing.    Eyes: Negative for discharge.   Respiratory: Negative for cough, shortness of breath and wheezing.    Cardiovascular: Negative for chest pain and  palpitations.   Gastrointestinal: Negative for diarrhea, nausea and vomiting.   Allergic/Immunologic: Negative for environmental allergies and food allergies.   Neurological: Positive for headaches. Negative for dizziness and light-headedness.   Hematological: Negative for adenopathy.   Psychiatric/Behavioral: Negative for sleep disturbance.       Objective:      Physical Exam   Constitutional: She is oriented to person, place, and time. She appears well-developed and well-nourished. She is cooperative. No distress.   HENT:   Head: Normocephalic and atraumatic.   Right Ear: Hearing, tympanic membrane, external ear and ear canal normal. No middle ear effusion.   Left Ear: Hearing, tympanic membrane, external ear and ear canal normal.  No middle ear effusion.   Nose: Mucosal edema present. No rhinorrhea, nasal deformity or septal deviation. No epistaxis. Right sinus exhibits maxillary sinus tenderness and frontal sinus tenderness. Left sinus exhibits frontal sinus tenderness. Left sinus exhibits no maxillary sinus tenderness.   Mouth/Throat: Uvula is midline, oropharynx is clear and moist and mucous membranes are normal. Mucous membranes are not pale and not dry. No trismus in the jaw. Normal dentition. No uvula swelling. No oropharyngeal exudate or posterior oropharyngeal erythema. Tonsils are 2+ on the right. Tonsils are 2+ on the left. No tonsillar exudate.   Eyes: Conjunctivae, EOM and lids are normal. Pupils are equal, round, and reactive to light. Right eye exhibits no chemosis. Left eye exhibits no chemosis. Right conjunctiva is not injected. Left conjunctiva is not injected. No scleral icterus. Right eye exhibits normal extraocular motion and no nystagmus. Left eye exhibits normal extraocular motion and no nystagmus.   Neck: Trachea normal and phonation normal. No tracheal tenderness present. No tracheal deviation present. No thyroid mass and no thyromegaly present.   Cardiovascular: Intact distal pulses.     Pulmonary/Chest: Effort normal. No stridor. No respiratory distress.   Abdominal: She exhibits no distension.   Lymphadenopathy:        Head (right side): No submental, no submandibular, no preauricular and no posterior auricular adenopathy present.        Head (left side): No submental, no submandibular, no preauricular and no posterior auricular adenopathy present.     She has no cervical adenopathy.   Neurological: She is alert and oriented to person, place, and time. No cranial nerve deficit.   Skin: Skin is warm and dry. No rash noted. No erythema.   Psychiatric: She has a normal mood and affect. Her behavior is normal.     No results found for this or any previous visit.       Results for orders placed during the hospital encounter of 11/13/17   CT [facility] Sinuses without     Narrative Comparisons: None     History: Recurrent RIGHT maxillary sinusitis, chronic sinusitis     Findings: Axial CT of the sinuses was performed per navigational protocol for preoperative planning purposes.     RIGHT frontal, ethmoid and bilateral maxillary sinus disease noted.  The LEFT a frontal, maxillary and sphenoid sinuses are clear.  Smooth deviation of the medial wall the RIGHT orbit to 4 CM midline noted.  Appearances suggest possible sequela from previous trauma versus normal variant.  There is opacification of multiple adjacent to ethmoid air cells and partial opacification of the RIGHT frontal sinus.  Nodular or polypoid mucosal thickening noted involving the inferior margins of the maxillary sinuses.  No sinus expansion or bony erosion.  RIGHT OMU appears obstructed.  The LEFT OMU is clear.  Bony septum is remarkable for minimal rightward bowing and a small right-sided bony spur noted.     Impression  As above.        Electronically signed by:       WAYNE MOE III, MD  Date:                                                11/13/17  Time:                                       14:55                        Assessment:          Chronic maxillary, ethmoid and frontal sinusitis       Plan:           Winter has chronic sinusitis without polyps and has been on maximal medical therapy as outlined in the HPI.  My recommendation is for endoscopic sinus surgery including right frontal, maxillary and ethmoid.  We discussed the need for postoperative debridements as well as chronic allergy management postoperatively. We discussed at length the risk of sinus surgery including, but not limited to, recurrence of disease, bleeding, infection, septal perforation, tooth or cheek numbness, vision changes, orbital injury, CSF leak and changes in smell.  The patient had opportunity to ask questions and I answered all of them to their satisfaction.  We will proceed as planned. She is consented to surgery.               Patient: Winter Sood [id number], :1990              Procedure date:2017  Patient's medications, allergies, past medical, surgical, social and family histories were reviewed and updated as appropriate.  Chief Complaint:  Follow-up (3 week rtc review ct scan per tim )     HPI:  Winter is a 27 y.o. female with the history of present illness as discussed in the clinic note from today.     Procedure: Risks, benefits, and alternatives of the procedure were discussed with the patient, and the patient consented to the nasal endoscopy.  The nasal cavity was sprayed with a topical decongestant and anesthetic (if needed). The endoscope was passed into each nostril and each nasal cavity was visualized.  On each side the nasal cavity, sinuses (if open), turbinates, and septum were examined with the findings described below. At the end of the examination, the scope was removed. The patient tolerated the procedure well with no complications.         Endoscopic Sinonasal Exam Findings:  -     The right side has moderate edema  -     The left side has normal mucosa  -     Nasal secretions: purulent secretions on the right  -      Nasal septum: no significant deviation or perforation appreciated   -     Inferior turbinate: hypertrophy or edema (Moderate) on the right  -     Middle turbinate: severely lateralized and appears scarred onto the lamina on the right  -     Other findings: No mass or obstructive lesion     Assessment & Plan:  - see today's clinic note    Noe Manning MD.

## 2018-06-13 NOTE — ANESTHESIA POSTPROCEDURE EVALUATION
"Anesthesia Post Evaluation    Patient: Winter Sood    Procedure(s) Performed: Procedure(s) (LRB):  FESS (FUNCTIONAL ENDOSCOPIC SINUS SURGERY) (Right)    Final Anesthesia Type: general  Patient location during evaluation: PACU  Patient participation: Yes- Able to Participate  Level of consciousness: awake and alert  Post-procedure vital signs: reviewed and stable  Pain management: adequate  Airway patency: patent  PONV status at discharge: No PONV  Anesthetic complications: no      Cardiovascular status: blood pressure returned to baseline  Respiratory status: unassisted  Hydration status: euvolemic  Follow-up not needed.        Visit Vitals  /73   Pulse 86   Temp 36.9 °C (98.5 °F) (Oral)   Resp 16   Ht 5' 2" (1.575 m)   Wt 64.1 kg (141 lb 5 oz)   LMP 04/22/2018 (Approximate)   SpO2 98%   Breastfeeding? No   BMI 25.85 kg/m²       Pain/Dawit Score: Pain Assessment Performed: Yes (6/13/2018 11:20 AM)  Presence of Pain: complains of pain/discomfort (6/13/2018 11:45 AM)  Pain Rating Prior to Med Admin: 5 (6/13/2018 11:22 AM)  Dawit Score: 10 (6/13/2018 11:45 AM)      "

## 2018-06-13 NOTE — BRIEF OP NOTE
[facility]  Discharge Note  Short Stay    Admit Date: 6/13/2018    Discharge Date and Time: 06/13/2018 8:00 AM     Attending Physician: Noe Manning MD     Discharge Provider: Noe Manning    Diagnoses:  Active Hospital Problems    Diagnosis  POA    *Chronic right maxillary sinusitis [J32.0]  Yes    Chronic frontal sinusitis [J32.1]  Yes    Chronic ethmoiditis [J32.2]  Yes      Resolved Hospital Problems    Diagnosis Date Resolved POA   No resolved problems to display.       Discharged Condition: good    Hospital Course: Patient was admitted for an outpatient procedure and tolerated the procedure well with no complications.    Final Diagnoses: Same as principle problem    Disposition: Home or Self Care    Follow up/Patient Instructions:    Medications:  Reconciled Home Medications: Current Discharge Medication List      CONTINUE these medications which have NOT CHANGED    Details   fluticasone (FLONASE) 50 mcg/actuation nasal spray 2 sprays by Each Nare route once daily.               Discharge Procedure Orders  Diet general     Activity as tolerated     No dressing needed     Call MD for:  temperature >100.4     Call MD for:  persistent nausea and vomiting     Call MD for:  severe uncontrolled pain           Discharge Procedure Orders (must include Diet, Follow-up, Activity):    Discharge Procedure Orders (must include Diet, Follow-up, Activity)  Diet general     Activity as tolerated     No dressing needed     Call MD for:  temperature >100.4     Call MD for:  persistent nausea and vomiting     Call MD for:  severe uncontrolled pain          Follow-up Information     Noe Manning MD In 1 week.    Specialty:  Otolaryngology  Contact information:  [address]  [place] [id number] [phone]

## 2018-06-13 NOTE — DISCHARGE INSTRUCTIONS
PLEASE PERFORM SINUS RINSES 5-7 TIMES DAILY UNTIL YOUR FIRST POST-OPERATIVE VISIT.          DIRECTIONS FOR SINUS SALINE RINSE To see demonstration: Enter [email or web address] [email or web address]/watch?[email or web address] into the browser address box, or go to You tube, and under the search box, enter sinus rinse. Click on [facility] Sinus Rinse Video    Step 1    Step 1 Please wash your hands. Fill the clean bottle with the designated volume of warm distilled water, filtered water or previously boiled water. You may warm the water in a microwave but we recommend that you warm it in increments of five seconds. This is to avoid excessive heating of the water and damage to the device or scalding your nasal passage.    Step 2    Step 2 Cut the SINUS RINSE mixture packet at the corner and pour its contents into the bottle. Tighten the cap & tube on the bottle securely, place one finger over the tip of the cap and shake the bottle gently to dissolve the mixture.      Step 3    Step 3 Standing in front of a sink, bend forward to your comfort level and tilt your head down. Keeping your mouth open without holding your breath, place cap snugly against your nasal passage and SQUEEZE BOTTLE GENTLY until the solution starts draining from the OPPOSITE nasal passage or from your mouth. Keep squeezing the bottle GENTLY until at least 1/4 to 1/2 (60 to 120 mL) of the bottle is used for a proper rinse. Do not swallow the solution.    Step 4    Blow your nose gently, without pinching your nose completely because this will apply pressure on the    eardrums. If tolerable, sniff in any residual solution remaining in the nasal passage once or twice prior to    blowing your nose as this may clean out the posterior nasopharyngeal area (the area at the back of your    nasal passage). Some solution will reach the back of the throat, so please spit it out. To help improve    drainage of any residual solution, blow your nose gently while tilting your head to the  opposite side of    the nasal passage that you just rinsed.    Step 5    Now repeat steps 3 & 4 for your other nasal passage.    Step 6 Air dry the Sinus Rinse bottle, cap, and tube on a clean paper towel, a glass plate to store the bottle cap and tube. If there is any solution leftover, please discard it. We recommend you make a fresh solution each time you rinse. Rinse 5 times each day, OR as directed by your physician. Warnings: DO NOT RINSE IF NASAL PASSAGE IS COMPLETELY BLOCKED OR IF YOU HAVE AN EAR INFECTION OR BLOCKED EARS. If you have had ear surgery, please contact your physician prior to irrigation. If you experience any pressure in your ears, stop the rinse and get further directions from your physician or contact our office during regular business hours. To avoid any ear discomfort: Heat the solution to lukewarm, do not use hot, boiling or cold water. Keep your mouth open. Do not hold your breath and if possible make the sound DOUG....DOUG... Make sure to take the position as shown. Gently squeeze 1/4 of the bottle at a time (60mL / 2 ounces). Stop the rinse if you feel any solution sensation near the ears. You may rinse with a partially blocked nasal passage. Please do not use for any other purposes. Please rinse at least ONE HOUR PRIOR to bedtime, in order to avoid any residual solution dripping in the throat.    >> Before using the SINUS RINSE kit, please inspect the cap, tube and bottle carefully for wear and    tear. If any of the components appear discolored or cracked, please contact [employer] to obtain a    replacement. You must follow the cleaning instructions provided in this brochure or cleaning instruction    card prior to each use.    >> The SINUS RINSE bottle and mixture are to be used only for nasalirrigation. Do not use for any other    purposes.    >> We recommend that you use the rinse ONE HOUR PRIOR to bedtime in order to avoid any residual    solution dripping in the throat.    Tips to  avoid ear discomfort while rinsing    If you have had ear surgery, please contact your physician prior to irrigation. Do not use if you have an    ear infection or blocked ears. Rinse with lukewarm water. Keep your mouth open. Do not hold your    breath while rinsing. While rinsing, make sure to tilt your. Gently squeeze the bottle while rinsing; do    not squeeze the bottle very forcefully. Stop the rinse if you feel a sensation of fluid near your ears.    Tips to avoid unexpected drainage after rinsing    In rare situations, especially if you have had sinus surgery, the saline solution can pool in the sinus    cavities and nasal passages and then drip from your nostrils hours after rinsing. To avoid this harmless    but annoying inconvenience, take one extra step after rinsing: lean forward, tilt your head sideways and    gently blow your nose. Then, tilt your head to the other side and blow again. You may need to repeat    this several times. This will help rid your nasal passages of any excess mucus and remaining saline    solution. If you find yourself experiencing delayed drainage often, do not rinse right before leaving your    house or going to bed.        ENDOSCOPIC SINUS SURGERY POST-OP INSTRUCTIONS    Rarely, is nasal packing (absorbent bandage) required (less than 5% of the time). This will be remove before you go home, or in some cases, on your first post-up appointment.    Slight trickling of blood in the back of your throat, or on your drip pad in front of your nose, and/or crusting of secretions for the first few days, is to be expected. You should change your drip pad periodically during the day as needed. This may be necessary for the first 3-5 days after surgery. You may also clean the hair-baring area of the nose daily, as needed, using a Q-tip and hydrogen peroxide. Keep your fingers out of your nose.    Diet: You may experience nausea after surgery. Avoid heavy meals on that day. Wait 24 hours  to gradually resume your normal diet. Drink plenty of fluids to maintain hydration (6-8 glasses daily minimum). Avoid alcohol or caffeine the first week after surgery.    Restricted Activities: the first week after surgery, your sinuses will feel very full and congested, this is due to the accumulation of dried blood and/or mucus crust. Do not plan to blow your nose, bend over or lift heavy objects (over 10 lbs) for the first week after surgery. This may result in a nose bleed. Rest and do not exert or overheat yourself with exercise or other physical activity.    Post-Operative pain: pain medication (Percocet or Acetaminophen with codeine) and/or anti-inflammatory medication (prednisone) may be ordered for significant post-operative pain, (FOR HEADACHES). You can still take extra-strength acetaminophen if the pain is not too uncomfortable, to avoid the occasional side-affects of nausea, constipation, or grogginess, associated with all narcotics. Use stool softener such as: dulcolax or Miralax to prevent constipation. Do not drive or handle heavy machinery while on narcotics. Throat discomfort is not too uncommon after endotracheal intubation during general anesthesia. This should resolve on it's own within 5 days. Throat lozenges or gargles tend to soothe the discomfort. Plane travel is allowed after your first post-op visit.    Do not take aspirin or anti-coagulant therapy 7-10 days after surgery unless otherwise indicated by your surgeon at time of discharge. If you are on Coumadin you will likely restart your normal dosage 24 hours after surgery. However, check with your doctor first.    Post-operative Office Visits: The first visit will be 7-10 days after surgery. At this time the physician will clean your sinus cavity by removing dried blood and/or mucus crusting to promote healing, to minimize the chance for secondary bacterial infection, and relieves congestion and/or headaches. It is suggested that you take  a couple of narcotics pills 1 hour prior to your first post op appointment. This is especially important for your first debridement, when your internal nose is still slightly swollen and tender.    If indicated by the physician, patient may start nasal irrigations, such as Sinus Rinse, or resume any other previous treatment.    Signs and symptoms that MUST be reported to the physician/nurse immediately by [phone]. *Fever over 102 degree F. *Persistent bleeding with more that ¼ cup of bright red blood within a short period of time (half hour). *Severe pain unrelieved by prescribed medication. *Mental or visual changes (confusion, slurred speech, double- vision, visual loss).    * If you experience difficulty breathing, shortness of breath, or severe bleeding, call 911 or go to the nearest emergency room.    General Information:    1. Do not drink alcoholic beverages including beer for 24 hours or as long as you are on pain medication..  2. Do not drive a motor vehicle, operate machinery or power tools, or signs legal papers for 24 hours or as long as you are on pain medication.   3. You may experience light-headedness, dizziness, and sleepiness following surgery. Please do not stay alone. A responsible adult should be with you for this 24 hour period.  4. Go home and rest.  5. Progress slowly to a normal diet unless instructed.  Otherwise, begin with liquids such as soft drinks, then soup and crackers working up to solid foods. Drink plenty of nonalcoholic fluids.  6. Certain anesthetics and pain medications produce nausea and vomiting in certain individuals. If nausea becomes a problem at home, call you doctor.  7. A nurse will be calling you sometime after surgery. Do not be alarmed. This is our way of finding out how you are doing.  8. Several times every hour while you are awake, take 2-3 deep breaths and cough. If you had stomach surgery hold a pillow or rolled towel firmly against your stomach  before you cough. This will help with any pain the cough might cause.  9. Several times every hour while you are awake, pump and flex your feet 5-6 times and do foot circles. This will help prevent blood clots.  10. Call your doctor for severe pain, bleeding, fever, or signs or symptoms of infection (pain, swelling, redness, foul odor, drainage).  11. You can contact your doctor anytime by [phone] for the [facility] (at [facility]) or [phone] for the [facility] on [facility].   [email or web address] is another way to contact your doctor if you are an active participant online with [facility].  12. Continue Nozin provided at discharge twice daily for 7 days or until the incision is healed.  See pamphlet or box for instructions.

## 2018-06-13 NOTE — TRANSFER OF CARE
"Anesthesia Transfer of Care Note    Patient: Winter Sood    Procedure(s) Performed: Procedure(s) (LRB):  FESS (FUNCTIONAL ENDOSCOPIC SINUS SURGERY) (Right)    Patient location: PACU    Anesthesia Type: general    Transport from OR: Transported from OR on room air with adequate spontaneous ventilation    Post pain: adequate analgesia    Post assessment: no apparent anesthetic complications and tolerated procedure well    Post vital signs: stable    Level of consciousness: awake    Nausea/Vomiting: no nausea/vomiting    Complications: none    Transfer of care protocol was followed      Last vitals:   Visit Vitals  /80 (BP Location: Right arm, Patient Position: Sitting)   Pulse 94   Temp 36.8 °C (98.2 °F) (Temporal)   Resp 18   Ht 5' 2" (1.575 m)   Wt 64.1 kg (141 lb 5 oz)   LMP 04/22/2018 (Approximate)   SpO2 100%   Breastfeeding? No   BMI 25.85 kg/m²     "

## 2018-06-13 NOTE — OP NOTE
Operative Note       Surgery Date: 6/13/2018     Surgeon(s) and Role:     * Noe Manning MD - Primary    Assistant:  Asia Pastor PA-C    Implants:  None    Pre-op Diagnosis:  Chronic frontal sinusitis [J32.1]  Chronic ethmoiditis [J32.2]  Chronic right maxillary sinusitis [J32.0]    Post-op Diagnosis: same    Anesthesia: GETA    Technical Procedures Used:     RIGHT  -  right Total ethmoidectomy with frontal sinus exploration (CPT 42206)  -  right Sphenoid sinusotomy without removal of tissue (CPT 04706)  -  right Maxillary antrostomy without removal of tissue (CPT 70862)    Procedure in Detail/Findings:    Endoscopic Sinonasal Exam Findings at TIME OF SURGERY:  -     Sinuses examined: right maxillary, right ethmoid anterior and posterior, right sphenoid and right frontal            The right sinus(es) have moderate edema            The left sinus(es) have n/a - exam not performed  -     Nasal secretions: thick glue-like mucous in the right maxillary  -     Nasal septum: right dorsal/high deviation   -     Inferior turbinate: hypertrophy or edema (Mild) bilaterally  -     Middle turbinate: hypertrophy or edema (Moderate) on the right  -     Other findings: - no mass or obstructive lesion -        Complications: No    Estimated Blood Loss: 55 mL           Specimens     Start     Ordered    06/13/18 0936  Specimen to Pathology - Surgery  Once      06/13/18 0956          Justification for the operation:     Winter has a history of chronic sinusitis without polyposis and has been on maximal medical therapy without significant clinical improvement.    We discussed at length the risk of sinus surgery including, but not limited to, recurrence of disease, bleeding, infection, septal perforation, tooth or cheek numbness, vision changes, orbital injury, CSF leak and changes in smell.  The patient had opportunity to ask questions and I answered all of them to their satisfaction.  We will proceed as planned.    Procedure in  Detail:      Prior to starting the case, the eyes were protected with OpSites and the nose was decongested bilaterally with Oxymetazoline-soaked cottonoids.  We used an endoscope to analyse the nasal cavities.      We infiltrated 1% Lidocaine with 1:100,000 Epinephrine into the nasal septum, [facility] anterior & posterior & nasal turbinates on the right side.    During the case, we did send pathology tissue from sinonasal contents.  Sinus specific cultureswere from maxillary.      We then performed a right sided maxillary sinusotomy without tissue removal.  A limited reduction of the middle turbinate head was required for visualization and access.  The maxillary sinus was entered through the natural ostium with resection of uncinate tissue.  A wide maxillary antrostomy was performed.  The maxillary had moderate edema and thick glue-like mucous.  Additional details/findings: none      We then performed a right sided Total ethmoidectomy.  The ethmoid sinuses were entered through the .  We used the microdebrider, curved suction, currette and non-thru cut forceps to remove ethmoid septations of the anterior and posterior ethmoid air cells.  Care was taken to not disrupt the lamina papyracea, fovea ethmoidalis, or middle/superior turbinate attachment to skull base during the complete dissection.  About 10% ethmoid mucosa was exenterated.  The ethmoids had moderate edema and - no discolored secretions noted -.  Additional details/findings: none    We then performed a right sided sphenoid sinusotomy without tissue removal.  A limited reduction of the middle turbinate tail was required for visualization and access.  The sphenoid sinus was entered via the landmarks of the choanae and superior turbinate.  We did palpate the back wall of the sphenoid to confirm our location.  A wide sphenoid sinusotomy was performed.  The sphenoid had normal mucosa and - no discolored secretions noted -.  Additional details/findings: none    We then  performed a right sided frontal sinusotomy without tissue removal.  The frontal sinus was entered through the landmarks of the middle turbinate and maxillary natural ostium.  A wide frontal sinusotomy was performed.  Specifically, we completed a Draf 2A.  The frontal had normal mucosa and - no discolored secretions noted -.  Additional details/findings: none    Vancomycin was irrigated in the sinuses.  Sinofoam was then placed into the sinuses.    The cavities were copiously irrigated and inspected for hemostasis.  Afrin soaked pleggets were then placed in the nasal cavities.  The patient was awoken and transferred to the recovery room in stable condition.             Disposition: PACU - hemodynamically stable.           Condition: Good    Attestation:  I was present and scrubbed for the entire procedure.

## 2018-06-15 LAB — BACTERIA SPEC AEROBE CULT: NORMAL

## 2018-06-18 LAB — BACTERIA SPEC ANAEROBE CULT: NORMAL

## 2018-06-19 VITALS
OXYGEN SATURATION: 98 % | HEART RATE: 86 BPM | RESPIRATION RATE: 16 BRPM | TEMPERATURE: 99 F | DIASTOLIC BLOOD PRESSURE: 73 MMHG | SYSTOLIC BLOOD PRESSURE: 121 MMHG | HEIGHT: 62 IN | WEIGHT: 141.31 LBS | BODY MASS INDEX: 26.01 KG/M2

## 2018-06-22 ENCOUNTER — OFFICE VISIT (OUTPATIENT)
Dept: OTOLARYNGOLOGY | Facility: CLINIC | Age: 28
End: 2018-06-22
Payer: COMMERCIAL

## 2018-06-22 VITALS
WEIGHT: 141.56 LBS | HEART RATE: 86 BPM | SYSTOLIC BLOOD PRESSURE: 104 MMHG | TEMPERATURE: 97 F | DIASTOLIC BLOOD PRESSURE: 69 MMHG | BODY MASS INDEX: 25.89 KG/M2

## 2018-06-22 DIAGNOSIS — J32.3 CHRONIC SPHENOIDAL SINUSITIS: ICD-10-CM

## 2018-06-22 DIAGNOSIS — J32.2 CHRONIC ETHMOIDITIS: Primary | ICD-10-CM

## 2018-06-22 DIAGNOSIS — J32.0 CHRONIC RIGHT MAXILLARY SINUSITIS: ICD-10-CM

## 2018-06-22 DIAGNOSIS — J32.1 CHRONIC FRONTAL SINUSITIS: ICD-10-CM

## 2018-06-22 PROCEDURE — 99213 OFFICE O/P EST LOW 20 MIN: CPT | Mod: 25,S$GLB,, | Performed by: OTOLARYNGOLOGY

## 2018-06-22 PROCEDURE — 3008F BODY MASS INDEX DOCD: CPT | Mod: CPTII,S$GLB,, | Performed by: OTOLARYNGOLOGY

## 2018-06-22 PROCEDURE — 31237 NSL/SINS NDSC SURG BX POLYPC: CPT | Mod: RT,S$GLB,, | Performed by: OTOLARYNGOLOGY

## 2018-06-22 PROCEDURE — 99999 PR PBB SHADOW E&M-EST. PATIENT-LVL III: CPT | Mod: PBBFAC,,, | Performed by: OTOLARYNGOLOGY

## 2018-06-22 RX ORDER — METRONIDAZOLE 500 MG/1
500 TABLET ORAL EVERY 12 HOURS
Qty: 28 TABLET | Refills: 0 | Status: SHIPPED | OUTPATIENT
Start: 2018-06-22 | End: 2018-07-06

## 2018-06-22 NOTE — PROGRESS NOTES
Subjective:   Patient: Winter Sood [id number], :1990   Visit date:2018 12:24 PM    Chief Complaint: Status post sinus surgery    HPI:  Winter is a 28 y.o. female with Chronic sinusitis.    Subjective:  Overall doing fairly well.  No significant bleeding.  Using saline rinses.  Moderate drainage.     Surgery: 18    Sinuses operated/OR findings:   Right maxillary, frontal, ethmoid, sphenoid    Thick glue like mucus in right maxillary    Final path:   FINAL PATHOLOGIC DIAGNOSIS  Right nasal contents:  Bone fragments and respiratory mucosa with lymphocytic inflammation;  No significant eosinophilic component.  [facility]  Diagnosed by: Sherif Biggs M.D.  (Electronically Signed: 2018-06-15 15:21:26)    OR Culture: Proprionobacterium          Review of Systems:  -     Allergic/Immunologic: is allergic to grape and pcn [penicillins]..  -     Constitutional: Current temp: 96.9 °F (36.1 °C) (Tympanic)    Her meds, allergies, medical, surgical, social & family histories were reviewed & updated:  -     She has a current medication list which includes the following prescription(s): metronidazole, ondansetron, and oxycodone-acetaminophen.  -     She  has a past medical history of Headache.   -     She  does not have any pertinent problems on file.   -     She  has a past surgical history that includes [place] tooth extraction (Left, ); functional endoscopic sinus surgery (fess) (Right, 2018); ethmoidectomy (Right, 2018); Frontal sinus obliteration (Right, 2018); and antrostomy of maxillary sinus at inferior nasal meatus (Right, 2018).  -     She  reports that she has never smoked. She has never used smokeless tobacco. She reports that she does not drink alcohol or use drugs.  -     Her family history includes Asthma in her brother; Migraines in her brother and mother.  -     She is allergic to grape and pcn [penicillins].    Objective:   Physical Exam:  Vitals:  /69   Pulse  86   Temp 96.9 °F (36.1 °C) (Tympanic)   Wt 64.2 kg (141 lb 8.6 oz)   BMI 25.89 kg/m²   General appearance:  Well developed, well nourished    Eyes:  Extraocular motions intact, PERRL    Communication:  no hoarseness, no dysphonia    Ears:  Otoscopy of external auditory canals and tympanic membranes was normal, clinical speech reception thresholds grossly intact, no mass/lesion of auricle.  Nose:  No masses/lesions of external nose, nasal mucosa, septum, and turbinates were within normal limits.  Mouth:  No mass/lesion of lips, teeth, gums, hard/soft palate, tongue, tonsils, or oropharynx.    Cardiovascular:  No pedal edema; Radial Pulses +2     Neck & Lymphatics:  No cervical lymphadenopathy, no neck mass/crepitus/ asymmetry, trachea is midline, no thyroid enlargement/tenderness/mass.    Psych: Oriented x3,  Alert with normal mood and affect.     Respiration/Chest:  Symmetric expansion during respiration, normal respiratory effort.    Skin:  Warm and intact. No ulcerations of face, scalp, neck.      Assessment & Plan:   Chronic Rhinosinusitis s/p endoscopic sinus surgery.  Individualized endoscopic debridements following routine functional endoscopic sinus surgery (FESS) provide improved long-term outcomes and may prevent future, more extensive revision procedures. Although two to three debridements in the first 30 days is typical for the majority of patients, once a week may be an appropriate frequency for postoperative debridement  in select patients with difficult problems. However, the frequency and length of time for which debridement is medically necessary will vary from case to case and must be individualized, a conclusion, which multiple studies analyzing debridement outcomes have acknowledged.  While 2-3 debridements will likely suffice in the majority of cases, there are situations in which a patient may require more frequent, very long-term debridements. Clinically, these include, but are not limited  to, persistent crusting within the surgical bed, adhesion formation noted upon examination, more extensive surgery (eg, complex frontal sinusotomies, neoplasm  resections), underlying immunologic disorders, diffuse polyposis, revision FESS, mucociliary disorders, allergic fungal sinusitis, and postoperative complications (eg, visual loss, cerebrospinal fluid leak, nasal hemorrhage).     Winter presents today with significant crusting, discharge, synechia formation or narrowing of sinus ostia.  Therefore, the decision for endoscopic sinus debridement was determined to be necessary.    Winter will return to clinic in 4 weeks.    Changes to medical regimen: Flagyl 2 weeks.  Continue irrigations              NASAL ENDOSCOPY WITH POLYPECTOMY &/OR DEBRIDEMENT  (cpt 53914)  Procedure: Risks, benefits, and alternatives of the procedure were discussed with the patient, and the patient consented to the nasal endoscopy with debridement.  The nasal cavity was sprayed with a topical decongestant and anesthetic . The endoscope was passed into each nostril and each nasal cavity was visualized.  On each side the nasal cavity, sinuses (if open), turbinates, and septum were examined with the findings described below.  Crusting and polypoid material was removed with suction and straight non thru cut forceps.   At the end of the examination, the scope was removed. The patient tolerated the procedure well with no complications.     Endoscopic Sinonasal Exam Findings:  -     Sinuses examined: right maxillary, right ethmoid anterior/posterior, right sphenoid and right frontal            The right sinus(es) have mild edema            The left sinus(es) have n/a - exam not performed  -     Nasal secretions: dried crusts and thick glue-like mucous on the right  -     Nasal septum: no significant deviation and no perforation appreciated   -     Inferior turbinate: - normal mucosa without significant hypertrophy - on the right  -     Middle  turbinate: turbinate partially resected on the right  -     Other findings: - no mass or obstructive lesion -    Assessment & Plan:  See today's clinic note.

## 2018-07-23 ENCOUNTER — OFFICE VISIT (OUTPATIENT)
Dept: OTOLARYNGOLOGY | Facility: CLINIC | Age: 28
End: 2018-07-23
Payer: COMMERCIAL

## 2018-07-23 VITALS
WEIGHT: 145.06 LBS | SYSTOLIC BLOOD PRESSURE: 114 MMHG | TEMPERATURE: 98 F | DIASTOLIC BLOOD PRESSURE: 81 MMHG | HEART RATE: 95 BPM | BODY MASS INDEX: 26.53 KG/M2

## 2018-07-23 DIAGNOSIS — J32.2 CHRONIC ETHMOIDITIS: Primary | ICD-10-CM

## 2018-07-23 DIAGNOSIS — J32.1 CHRONIC FRONTAL SINUSITIS: ICD-10-CM

## 2018-07-23 DIAGNOSIS — J33.9 NASAL POLYPOSIS: ICD-10-CM

## 2018-07-23 DIAGNOSIS — J32.0 CHRONIC RIGHT MAXILLARY SINUSITIS: ICD-10-CM

## 2018-07-23 DIAGNOSIS — J32.3 CHRONIC SPHENOIDAL SINUSITIS: ICD-10-CM

## 2018-07-23 PROCEDURE — 99999 PR PBB SHADOW E&M-EST. PATIENT-LVL III: CPT | Mod: PBBFAC,,, | Performed by: OTOLARYNGOLOGY

## 2018-07-23 PROCEDURE — 31231 NASAL ENDOSCOPY DX: CPT | Mod: 52,S$GLB,, | Performed by: OTOLARYNGOLOGY

## 2018-07-23 PROCEDURE — 3008F BODY MASS INDEX DOCD: CPT | Mod: CPTII,S$GLB,, | Performed by: OTOLARYNGOLOGY

## 2018-07-23 PROCEDURE — 99213 OFFICE O/P EST LOW 20 MIN: CPT | Mod: 25,S$GLB,, | Performed by: OTOLARYNGOLOGY

## 2018-07-23 RX ORDER — PREDNISONE 20 MG/1
TABLET ORAL
Qty: 21 TABLET | Refills: 0 | Status: SHIPPED | OUTPATIENT
Start: 2018-07-23 | End: 2018-08-24 | Stop reason: ALTCHOICE

## 2018-07-23 NOTE — PROGRESS NOTES
Subjective:   Patient: Winter Sood [id number], :1990   Visit date:2018 12:24 PM    Chief Complaint: Status post sinus surgery    HPI:  Winter is a 28 y.o. female with Chronic sinusitis.    Subjective:  Overall doing fairly well.  No significant bleeding.  Using saline rinses.  Finished flagyl.  Was having ear pressure but this resolved.        Surgery: 18    Sinuses operated/OR findings:   Right maxillary, frontal, ethmoid, sphenoid    Thick glue like mucus in right maxillary    Final path:   FINAL PATHOLOGIC DIAGNOSIS  Right nasal contents:  Bone fragments and respiratory mucosa with lymphocytic inflammation;  No significant eosinophilic component.  [facility]  Diagnosed by: Sherif Biggs M.D.  (Electronically Signed: 2018-06-15 15:21:26)    OR Culture: Proprionobacterium          Review of Systems:  -     Allergic/Immunologic: is allergic to grape and pcn [penicillins]..  -     Constitutional: Current temp: 98 °F (36.7 °C) (Tympanic)    Her meds, allergies, medical, surgical, social & family histories were reviewed & updated:  -     She has a current medication list which includes the following prescription(s): ondansetron and oxycodone-acetaminophen.  -     She  has a past medical history of Headache.   -     She  does not have any pertinent problems on file.   -     She  has a past surgical history that includes [place] tooth extraction (Left, ); functional endoscopic sinus surgery (fess) (Right, 2018); ethmoidectomy (Right, 2018); Frontal sinus obliteration (Right, 2018); and antrostomy of maxillary sinus at inferior nasal meatus (Right, 2018).  -     She  reports that she has never smoked. She has never used smokeless tobacco. She reports that she does not drink alcohol or use drugs.  -     Her family history includes Asthma in her brother; Migraines in her brother and mother.  -     She is allergic to grape and pcn [penicillins].    Objective:   Physical  Exam:  Vitals:  /81 (BP Location: Right arm, Patient Position: Sitting)   Pulse 95   Temp 98 °F (36.7 °C) (Tympanic)   Wt 65.8 kg (145 lb 1 oz)   BMI 26.53 kg/m²   General appearance:  Well developed, well nourished    Eyes:  Extraocular motions intact, PERRL    Communication:  no hoarseness, no dysphonia    Ears:  Otoscopy of external auditory canals and tympanic membranes was normal, clinical speech reception thresholds grossly intact, no mass/lesion of auricle.  Nose:  No masses/lesions of external nose, nasal mucosa, septum, and turbinates were within normal limits.  Mouth:  No mass/lesion of lips, teeth, gums, hard/soft palate, tongue, tonsils, or oropharynx.    Cardiovascular:  No pedal edema; Radial Pulses +2     Neck & Lymphatics:  No cervical lymphadenopathy, no neck mass/crepitus/ asymmetry, trachea is midline, no thyroid enlargement/tenderness/mass.    Psych: Oriented x3,  Alert with normal mood and affect.     Respiration/Chest:  Symmetric expansion during respiration, normal respiratory effort.    Skin:  Warm and intact. No ulcerations of face, scalp, neck.      Assessment & Plan:   Chronic Rhinosinusitis s/p endoscopic sinus surgery.  Individualized endoscopic debridements following routine functional endoscopic sinus surgery (FESS) provide improved long-term outcomes and may prevent future, more extensive revision procedures. Although two to three debridements in the first 30 days is typical for the majority of patients, once a week may be an appropriate frequency for postoperative debridement  in select patients with difficult problems. However, the frequency and length of time for which debridement is medically necessary will vary from case to case and must be individualized, a conclusion, which multiple studies analyzing debridement outcomes have acknowledged.  While 2-3 debridements will likely suffice in the majority of cases, there are situations in which a patient may require more  frequent, very long-term debridements. Clinically, these include, but are not limited to, persistent crusting within the surgical bed, adhesion formation noted upon examination, more extensive surgery (eg, complex frontal sinusotomies, neoplasm  resections), underlying immunologic disorders, diffuse polyposis, revision FESS, mucociliary disorders, allergic fungal sinusitis, and postoperative complications (eg, visual loss, cerebrospinal fluid leak, nasal hemorrhage).     Winter presents today with significant crusting, discharge, synechia formation or narrowing of sinus ostia.  Therefore, the decision for endoscopic sinus debridement was determined to be necessary.    Winter will return to clinic in 4 weeks.    Changes to medical regimen:               NASAL ENDOSCOPY WITH POLYPECTOMY &/OR DEBRIDEMENT  (cpt 45074)  Procedure: Risks, benefits, and alternatives of the procedure were discussed with the patient, and the patient consented to the nasal endoscopy with debridement.  The nasal cavity was sprayed with a topical decongestant and anesthetic . The endoscope was passed into each nostril and each nasal cavity was visualized.  On each side the nasal cavity, sinuses (if open), turbinates, and septum were examined with the findings described below.  Crusting and polypoid material was removed with suction and straight non thru cut forceps.   At the end of the examination, the scope was removed. The patient tolerated the procedure well with no complications.     Endoscopic Sinonasal Exam Findings:  -     Sinuses examined: right maxillary, right ethmoid anterior/posterior, right sphenoid and right frontal            The right sinus(es) have noemy polyps on the right            The left sinus(es) have n/a - exam not performed  -     Nasal secretions: no significant secretions bilaterally  -     Nasal septum: no significant deviation and no perforation appreciated   -     Inferior turbinate: - normal mucosa without  significant hypertrophy - on the right  -     Middle turbinate: turbinate partially resected on the right  -     Other findings: - no mass or obstructive lesion -    Assessment & Plan:  See today's clinic note.

## 2018-07-23 NOTE — PROGRESS NOTES
Subjective:   Patient: Winter Sood [id number], :1990   Visit date:2018 12:24 PM    Chief Complaint: Status post sinus surgery    HPI:  Winter is a 28 y.o. female with Chronic sinusitis.    Subjective:  Overall doing fairly well.  No significant bleeding.  Significant decrease in pressure.  Completed flagyl.  Using saline rinses. Overall feeling better.     Surgery: 18    Sinuses operated/OR findings:   Right maxillary, frontal, ethmoid, sphenoid    Thick glue like mucus in right maxillary    Final path:   FINAL PATHOLOGIC DIAGNOSIS  Right nasal contents:  Bone fragments and respiratory mucosa with lymphocytic inflammation;  No significant eosinophilic component.  [facility]  Diagnosed by: Sherif Biggs M.D.  (Electronically Signed: 2018-06-15 15:21:26)    OR Culture: Proprionobacterium          Review of Systems:  -     Allergic/Immunologic: is allergic to grape and pcn [penicillins]..  -     Constitutional: Current temp: 98 °F (36.7 °C) (Tympanic)    Her meds, allergies, medical, surgical, social & family histories were reviewed & updated:  -     She has a current medication list which includes the following prescription(s): ondansetron and oxycodone-acetaminophen.  -     She  has a past medical history of Headache.   -     She  does not have any pertinent problems on file.   -     She  has a past surgical history that includes [place] tooth extraction (Left, ); functional endoscopic sinus surgery (fess) (Right, 2018); ethmoidectomy (Right, 2018); Frontal sinus obliteration (Right, 2018); and antrostomy of maxillary sinus at inferior nasal meatus (Right, 2018).  -     She  reports that she has never smoked. She has never used smokeless tobacco. She reports that she does not drink alcohol or use drugs.  -     Her family history includes Asthma in her brother; Migraines in her brother and mother.  -     She is allergic to grape and pcn [penicillins].    Objective:    Physical Exam:  Vitals:  /81 (BP Location: Right arm, Patient Position: Sitting)   Pulse 95   Temp 98 °F (36.7 °C) (Tympanic)   Wt 65.8 kg (145 lb 1 oz)   BMI 26.53 kg/m²   General appearance:  Well developed, well nourished    Eyes:  Extraocular motions intact, PERRL    Communication:  no hoarseness, no dysphonia    Ears:  Otoscopy of external auditory canals and tympanic membranes was normal, clinical speech reception thresholds grossly intact, no mass/lesion of auricle.  Nose:  No masses/lesions of external nose, nasal mucosa, septum, and turbinates were within normal limits.  Mouth:  No mass/lesion of lips, teeth, gums, hard/soft palate, tongue, tonsils, or oropharynx.    Cardiovascular:  No pedal edema; Radial Pulses +2     Neck & Lymphatics:  No cervical lymphadenopathy, no neck mass/crepitus/ asymmetry, trachea is midline, no thyroid enlargement/tenderness/mass.    Psych: Oriented x3,  Alert with normal mood and affect.     Respiration/Chest:  Symmetric expansion during respiration, normal respiratory effort.    Skin:  Warm and intact. No ulcerations of face, scalp, neck.      Assessment & Plan:   Chronic Rhinosinusitis s/p endoscopic sinus surgery.  Individualized endoscopic debridements following routine functional endoscopic sinus surgery (FESS) provide improved long-term outcomes and may prevent future, more extensive revision procedures. Although two to three debridements in the first 30 days is typical for the majority of patients, once a week may be an appropriate frequency for postoperative debridement  in select patients with difficult problems. However, the frequency and length of time for which debridement is medically necessary will vary from case to case and must be individualized, a conclusion, which multiple studies analyzing debridement outcomes have acknowledged.  While 2-3 debridements will likely suffice in the majority of cases, there are situations in which a patient may  require more frequent, very long-term debridements. Clinically, these include, but are not limited to, persistent crusting within the surgical bed, adhesion formation noted upon examination, more extensive surgery (eg, complex frontal sinusotomies, neoplasm  resections), underlying immunologic disorders, diffuse polyposis, revision FESS, mucociliary disorders, allergic fungal sinusitis, and postoperative complications (eg, visual loss, cerebrospinal fluid leak, nasal hemorrhage).     Winter presents today with significant crusting, discharge, synechia formation or narrowing of sinus ostia.  Therefore, the decision for endoscopic sinus debridement was determined to be necessary.    Winter will return to clinic in 4 weeks.    Changes to medical regimen: Budesonide irrigations 3 months.  Vanco/Tobra irrigations 1 month.  Prednisone burst.     RAST & CBC            NASAL ENDOSCOPY     Procedure: Risks, benefits, and alternatives of the procedure were discussed with the patient, and the patient consented to the nasal endoscopy with debridement.  The nasal cavity was sprayed with a topical decongestant and anesthetic . The endoscope was passed into each nostril and each nasal cavity was visualized.  On each side the nasal cavity, sinuses (if open), turbinates, and septum were examined with the findings described below.  Crusting and polypoid material was removed with suction and straight non thru cut forceps.   At the end of the examination, the scope was removed. The patient tolerated the procedure well with no complications.     Endoscopic Sinonasal Exam Findings:  -     Sinuses examined: right maxillary, right ethmoid anterior/posterior, right sphenoid and right frontal            The right sinus(es) have significant edema with polyps posteriorly            The left sinus(es) have no abnormalities  -     Nasal secretions: dried crusts and thick glue-like mucous on the right  -     Nasal septum: no significant  deviation and no perforation appreciated   -     Inferior turbinate: - normal mucosa without significant hypertrophy - on the right  -     Middle turbinate: turbinate partially resected on the right  -     Other findings: - no mass or obstructive lesion -    Assessment & Plan:  See today's clinic note.

## 2018-07-31 ENCOUNTER — LAB VISIT (OUTPATIENT)
Dept: LAB | Facility: HOSPITAL | Age: 28
End: 2018-07-31
Attending: OTOLARYNGOLOGY
Payer: COMMERCIAL

## 2018-07-31 DIAGNOSIS — J32.0 CHRONIC RIGHT MAXILLARY SINUSITIS: ICD-10-CM

## 2018-07-31 DIAGNOSIS — J33.9 NASAL POLYPOSIS: ICD-10-CM

## 2018-07-31 DIAGNOSIS — J32.2 CHRONIC ETHMOIDITIS: ICD-10-CM

## 2018-07-31 DIAGNOSIS — J32.1 CHRONIC FRONTAL SINUSITIS: ICD-10-CM

## 2018-07-31 DIAGNOSIS — J32.3 CHRONIC SPHENOIDAL SINUSITIS: ICD-10-CM

## 2018-07-31 LAB
BASOPHILS # BLD AUTO: 0.06 K/UL
BASOPHILS NFR BLD: 0.6 %
DIFFERENTIAL METHOD: ABNORMAL
EOSINOPHIL # BLD AUTO: 0.1 K/UL
EOSINOPHIL NFR BLD: 0.9 %
ERYTHROCYTE [DISTWIDTH] IN BLOOD BY AUTOMATED COUNT: 14.6 %
HCT VFR BLD AUTO: 41.6 %
HGB BLD-MCNC: 13.1 G/DL
IMM GRANULOCYTES # BLD AUTO: 0.02 K/UL
IMM GRANULOCYTES NFR BLD AUTO: 0.2 %
LYMPHOCYTES # BLD AUTO: 5.5 K/UL
LYMPHOCYTES NFR BLD: 56.2 %
MCH RBC QN AUTO: 28.3 PG
MCHC RBC AUTO-ENTMCNC: 31.5 G/DL
MCV RBC AUTO: 90 FL
MONOCYTES # BLD AUTO: 0.5 K/UL
MONOCYTES NFR BLD: 4.8 %
NEUTROPHILS # BLD AUTO: 3.6 K/UL
NEUTROPHILS NFR BLD: 37.3 %
NRBC BLD-RTO: 0 /100 WBC
PLATELET # BLD AUTO: 241 K/UL
PMV BLD AUTO: 11.8 FL
RBC # BLD AUTO: 4.63 M/UL
WBC # BLD AUTO: 9.72 K/UL

## 2018-07-31 PROCEDURE — 86003 ALLG SPEC IGE CRUDE XTRC EA: CPT

## 2018-07-31 PROCEDURE — 36415 COLL VENOUS BLD VENIPUNCTURE: CPT | Mod: PO

## 2018-07-31 PROCEDURE — 85025 COMPLETE CBC W/AUTO DIFF WBC: CPT

## 2018-07-31 PROCEDURE — 86003 ALLG SPEC IGE CRUDE XTRC EA: CPT | Mod: 59

## 2018-08-02 LAB
A ALTERNATA IGE QN: <0.35 KU/L
A FUMIGATUS IGE QN: <0.35 KU/L
ALLERGEN BOXELDER MAPLE TREE IGE: <0.35 KU/L
ALLERGEN MAPLE (BOX ELDER) CLASS: NORMAL
ALLERGEN MULBERRY CLASS: NORMAL
ALLERGEN MULBERRY TREE IGE: <0.35 KU/L
ALLERGEN PENICILLIUM IGE: <0.35 KU/L
ALLERGEN WHITE ASH TREE IGE: <0.35 KU/L
AMER SYCAMORE IGE QN: <0.35 KU/L
BALD CYPRESS IGE QN: <0.35 KU/L
BERMUDA GRASS IGE QN: <0.35 KU/L
C HERBARUM IGE QN: <0.35 KU/L
CAT DANDER IGE QN: <0.35 KU/L
CEDAR IGE QN: <0.35 KU/L
CMN PIGWEED IGE QN: <0.35 KU/L
COCKLEBUR IGE QN: <0.35 KU/L
COMMON RAGWEED IGE QN: <0.35 KU/L
D FARINAE IGE QN: <0.35 KU/L
D PTERONYSS IGE QN: <0.35 KU/L
DEPRECATED A ALTERNATA IGE RAST QL: NORMAL
DEPRECATED A FUMIGATUS IGE RAST QL: NORMAL
DEPRECATED BALD CYPRESS IGE RAST QL: NORMAL
DEPRECATED BERMUDA GRASS IGE RAST QL: NORMAL
DEPRECATED C HERBARUM IGE RAST QL: NORMAL
DEPRECATED CAT DANDER IGE RAST QL: NORMAL
DEPRECATED CEDAR IGE RAST QL: NORMAL
DEPRECATED COCKLEBUR IGE RAST QL: NORMAL
DEPRECATED COMMON PIGWEED IGE RAST QL: NORMAL
DEPRECATED COMMON RAGWEED IGE RAST QL: NORMAL
DEPRECATED D FARINAE IGE RAST QL: NORMAL
DEPRECATED D PTERONYSS IGE RAST QL: NORMAL
DEPRECATED DOG DANDER IGE RAST QL: NORMAL
DEPRECATED ENGL PLANTAIN IGE RAST QL: NORMAL
DEPRECATED GOOSEFOOT IGE RAST QL: NORMAL
DEPRECATED JOHNSON GRASS IGE RAST QL: NORMAL
DEPRECATED KENT BLUE GRASS IGE RAST QL: NORMAL
DEPRECATED M RACEMOSUS IGE RAST QL: NORMAL
DEPRECATED MARSH ELDER IGE RAST QL: NORMAL
DEPRECATED MUGWORT IGE RAST QL: NORMAL
DEPRECATED NETTLE IGE RAST QL: NORMAL
DEPRECATED PECAN/HICK TREE IGE RAST QL: NORMAL
DEPRECATED ROACH IGE RAST QL: NORMAL
DEPRECATED SHEEP SORREL IGE RAST QL: NORMAL
DEPRECATED SILVER BIRCH IGE RAST QL: NORMAL
DEPRECATED TIMOTHY IGE RAST QL: NORMAL
DEPRECATED WHITE OAK IGE RAST QL: NORMAL
DOG DANDER IGE QN: <0.35 KU/L
ELM CEDAR CLASS: NORMAL
ELM CEDAR, IGE: <0.35 KU/L
ENGL PLANTAIN IGE QN: <0.35 KU/L
FEATHER PANEL #2: <0.35 KU/L
GOOSEFOOT IGE QN: <0.35 KU/L
JOHNSON GRASS IGE QN: <0.35 KU/L
KENT BLUE GRASS IGE QN: <0.35 KU/L
M RACEMOSUS IGE QN: <0.35 KU/L
MARSH ELDER IGE QN: <0.35 KU/L
MUGWORT IGE QN: <0.35 KU/L
NETTLE IGE QN: <0.35 KU/L
PECAN/HICK TREE IGE QN: <0.35 KU/L
PENICILLIUM CLASS: NORMAL
ROACH IGE QN: <0.35 KU/L
SHEEP SORREL IGE QN: <0.35 KU/L
SILVER BIRCH IGE QN: <0.35 KU/L
TIMOTHY IGE QN: <0.35 KU/L
WHITE ASH CLASS: NORMAL
WHITE OAK IGE QN: <0.35 KU/L

## 2018-08-22 NOTE — PROGRESS NOTES
Subjective:   Patient: Winter Sood [id number], :1990   Visit date:2018 12:24 PM    Chief Complaint: Status post sinus surgery    HPI:  Winter is a 28 y.o. female with Chronic sinusitis.    Subjective: Patient is 9 wks s/p FESS. Since her last OV on 18, she has been doing budesonide/ vanc/ tobra rinse, completed a prednisone burst. She also completed a CBC and RAST for allergy testing- no allergies, no eosinophilia.   She reports no facial pressure or pain.  She has intermittently had a large mucus plug dislodge into her throat.  This has happened 3-4 times since surgery.          Surgery: 18    Sinuses operated/OR findings:   Right maxillary, frontal, ethmoid, sphenoid    Thick glue like mucus in right maxillary    Final path:   FINAL PATHOLOGIC DIAGNOSIS  Right nasal contents:  Bone fragments and respiratory mucosa with lymphocytic inflammation;  No significant eosinophilic component.  [facility]  Diagnosed by: Sherif Biggs M.D.  (Electronically Signed: 2018-06-15 15:21:26)    OR Culture: Proprionobacterium          Review of Systems:  -     Allergic/Immunologic: is allergic to grape and pcn [penicillins]..  -     Constitutional: Current temp: 98 °F (36.7 °C) (Tympanic)    Her meds, allergies, medical, surgical, social & family histories were reviewed & updated:  -     She has a current medication list which includes the following prescription(s): budesonide.  -     She  has a past medical history of Headache.   -     She does not have any pertinent problems on file.   -     She  has a past surgical history that includes [place] tooth extraction (Left, ); FESS (FUNCTIONAL ENDOSCOPIC SINUS SURGERY) (Right, 2018); ETHMOIDECTOMY (Right, 2018); SINUSOTOMY, FRONTAL SINUS, OBLITERATIVE (Right, 2018); and MAXILLARY ANTROSTOMY, AT INFERIOR NASAL MEATUS (Right, 2018).  -     She  reports that  has never smoked. she has never used smokeless tobacco. She reports that she  does not drink alcohol or use drugs.  -     Her family history includes Asthma in her brother; Migraines in her brother and mother.  -     She is allergic to grape and pcn [penicillins].    Objective:   Physical Exam:  Vitals:  /75   Pulse 81   Temp 98 °F (36.7 °C) (Tympanic)   Wt 66 kg (145 lb 8.1 oz)   BMI 26.61 kg/m²   General appearance:  Well developed, well nourished    Eyes:  Extraocular motions intact, PERRL    Communication:  no hoarseness, no dysphonia    Ears:  Otoscopy of external auditory canals and tympanic membranes was normal, clinical speech reception thresholds grossly intact, no mass/lesion of auricle.  Nose:  No masses/lesions of external nose, nasal mucosa, septum, and turbinates were within normal limits.  Mouth:  No mass/lesion of lips, teeth, gums, hard/soft palate, tongue, tonsils, or oropharynx.    Cardiovascular:  No pedal edema; Radial Pulses +2     Neck & Lymphatics:  No cervical lymphadenopathy, no neck mass/crepitus/ asymmetry, trachea is midline, no thyroid enlargement/tenderness/mass.    Psych: Oriented x3,  Alert with normal mood and affect.     Respiration/Chest:  Symmetric expansion during respiration, normal respiratory effort.    Skin:  Warm and intact. No ulcerations of face, scalp, neck.      RAST Panel is insignificant for any allergies.       Assessment & Plan:     Culture taken  Polypectomy performed  Continue rinses  Will call when cultures are back        NASAL ENDOSCOPY WITH POLYPECTOMY &/OR DEBRIDEMENT  (cpt 72014)  Procedure: Risks, benefits, and alternatives of the procedure were discussed with the patient, and the patient consented to the nasal endoscopy with debridement.  The nasal cavity was sprayed with a topical decongestant and anesthetic . The endoscope was passed into each nostril and each nasal cavity was visualized.  On each side the nasal cavity, sinuses (if open), turbinates, and septum were examined with the findings described below.  Crusting  and polypoid material was removed with suction and straight non thru cut forceps.   At the end of the examination, the scope was removed. The patient tolerated the procedure well with no complications.     Endoscopic Sinonasal Exam Findings:  -     Sinuses examined: right maxillary, right ethmoid anterior and posterior, right sphenoid and right frontal            The right sinus(es): maxillary with essentially normal mucosa but mild edema on the floor of the sinus.  Sphenoid with normal mucosa.  Anterior ethmoids and frontal normal.  Posterior ethmoids with single large polyp.  After removal, some infectious debris was present. This was removed and sent for culture.  Of note, she has a fairly tight area making debridement uncomfortable.          -     Nasal septum: no significant deviation and no perforation appreciated   -     Inferior turbinate: - normal mucosa without significant hypertrophy - on the right  -     Middle turbinate: turbinate partially resected on the right  -     Other findings: - no mass or obstructive lesion -    Assessment & Plan:  See today's clinic note

## 2018-08-24 ENCOUNTER — OFFICE VISIT (OUTPATIENT)
Dept: OTOLARYNGOLOGY | Facility: CLINIC | Age: 28
End: 2018-08-24
Payer: COMMERCIAL

## 2018-08-24 VITALS
TEMPERATURE: 98 F | WEIGHT: 145.5 LBS | DIASTOLIC BLOOD PRESSURE: 75 MMHG | HEART RATE: 81 BPM | BODY MASS INDEX: 26.61 KG/M2 | SYSTOLIC BLOOD PRESSURE: 101 MMHG

## 2018-08-24 DIAGNOSIS — J32.4 CHRONIC PANSINUSITIS: Primary | ICD-10-CM

## 2018-08-24 DIAGNOSIS — J33.9 NASAL POLYPS: ICD-10-CM

## 2018-08-24 PROCEDURE — 87070 CULTURE OTHR SPECIMN AEROBIC: CPT

## 2018-08-24 PROCEDURE — 31237 NSL/SINS NDSC SURG BX POLYPC: CPT | Mod: RT,S$GLB,, | Performed by: OTOLARYNGOLOGY

## 2018-08-24 PROCEDURE — 87186 SC STD MICRODIL/AGAR DIL: CPT

## 2018-08-24 PROCEDURE — 99213 OFFICE O/P EST LOW 20 MIN: CPT | Mod: 25,S$GLB,, | Performed by: OTOLARYNGOLOGY

## 2018-08-24 PROCEDURE — 99999 PR PBB SHADOW E&M-EST. PATIENT-LVL III: CPT | Mod: PBBFAC,,, | Performed by: OTOLARYNGOLOGY

## 2018-08-24 PROCEDURE — 87077 CULTURE AEROBIC IDENTIFY: CPT

## 2018-08-24 PROCEDURE — 3008F BODY MASS INDEX DOCD: CPT | Mod: CPTII,S$GLB,, | Performed by: OTOLARYNGOLOGY

## 2018-08-27 ENCOUNTER — PATIENT MESSAGE (OUTPATIENT)
Dept: OTOLARYNGOLOGY | Facility: CLINIC | Age: 28
End: 2018-08-27

## 2018-08-28 LAB — BACTERIA SPEC AEROBE CULT: NORMAL

## 2018-08-29 ENCOUNTER — PATIENT MESSAGE (OUTPATIENT)
Dept: OTOLARYNGOLOGY | Facility: CLINIC | Age: 28
End: 2018-08-29

## 2018-08-29 RX ORDER — CIPROFLOXACIN 500 MG/1
500 TABLET ORAL 2 TIMES DAILY
Qty: 42 TABLET | Refills: 0 | Status: SHIPPED | OUTPATIENT
Start: 2018-08-29 | End: 2018-09-19

## 2018-08-30 ENCOUNTER — PATIENT MESSAGE (OUTPATIENT)
Dept: OTOLARYNGOLOGY | Facility: CLINIC | Age: 28
End: 2018-08-30

## 2018-08-30 RX ORDER — FLUCONAZOLE 100 MG/1
100 TABLET ORAL DAILY
Qty: 30 TABLET | Refills: 0 | Status: SHIPPED | OUTPATIENT
Start: 2018-08-30 | End: 2018-09-29

## 2018-09-10 ENCOUNTER — PATIENT MESSAGE (OUTPATIENT)
Dept: OTOLARYNGOLOGY | Facility: CLINIC | Age: 28
End: 2018-09-10

## 2018-10-15 ENCOUNTER — LAB VISIT (OUTPATIENT)
Dept: LAB | Facility: HOSPITAL | Age: 28
End: 2018-10-15
Attending: ORTHOPAEDIC SURGERY
Payer: COMMERCIAL

## 2018-10-15 ENCOUNTER — APPOINTMENT (OUTPATIENT)
Dept: LAB | Facility: HOSPITAL | Age: 28
End: 2018-10-15
Payer: COMMERCIAL

## 2018-10-15 DIAGNOSIS — M54.50 LUMBAGO: Primary | ICD-10-CM

## 2018-10-15 LAB
CRP SERPL-MCNC: 0.7 MG/L
ERYTHROCYTE [SEDIMENTATION RATE] IN BLOOD BY WESTERGREN METHOD: 24 MM/HR
URATE SERPL-MCNC: 4.9 MG/DL

## 2018-10-15 PROCEDURE — 86431 RHEUMATOID FACTOR QUANT: CPT

## 2018-10-15 PROCEDURE — 85652 RBC SED RATE AUTOMATED: CPT

## 2018-10-15 PROCEDURE — 36415 COLL VENOUS BLD VENIPUNCTURE: CPT | Mod: PO

## 2018-10-15 PROCEDURE — 81374 HLA I TYPING 1 ANTIGEN LR: CPT | Mod: PO

## 2018-10-15 PROCEDURE — 86140 C-REACTIVE PROTEIN: CPT

## 2018-10-15 PROCEDURE — 84550 ASSAY OF BLOOD/URIC ACID: CPT

## 2018-10-16 LAB — RHEUMATOID FACT SERPL-ACNC: <10 IU/ML

## 2018-10-24 LAB
HLA-B27 RELATED AG QL: NEGATIVE
HLA-B27 RELATED AG QL: NORMAL

## 2018-10-24 NOTE — PROGRESS NOTES
Subjective:   Patient: Winter Sood [id number], :1990   Visit date:10/26/2018 12:24 PM    Chief Complaint: Status post sinus surgery    HPI:  Winter is a 28 y.o. female with Chronic sinusitis.    Subjective: Patient is 4 months s/p FESS. At her last OV in August, a polypectomy was performed and cultures returned +pseudomonas, she was tx with Cipro x 3 weeks. She returns to clinic on 10/24/18 and reports that she had some significant joint pain while taking the cipro but this has essentially resolved.  She c/o some mild congestion in the am but otherwise none.  No drainage.  No facial pressure/pain.  Prior RAST negative.  CBC with no eosinophilia.  She was using budesonide/tobra rinses but stopped due to international travel.  Overall, she feels well.     Surgery: 18    Sinuses operated/OR findings:   Right maxillary, frontal, ethmoid, sphenoid    Thick glue like mucus in right maxillary    Final path:   FINAL PATHOLOGIC DIAGNOSIS  Right nasal contents:  Bone fragments and respiratory mucosa with lymphocytic inflammation;  No significant eosinophilic component.  [facility]  Diagnosed by: Sherif Biggs M.D.  (Electronically Signed: 2018-06-15 15:21:26)    OR Culture: Proprionobacterium        Review of Systems:  -     Allergic/Immunologic: is allergic to ciprofloxacin (bulk); grape; and pcn [penicillins]..  -     Constitutional: Current temp:      Her meds, allergies, medical, surgical, social & family histories were reviewed & updated:  -     She has a current medication list which includes the following prescription(s): mometasone and montelukast.  -     She  has a past medical history of Headache.   -     She does not have any pertinent problems on file.   -     She  has a past surgical history that includes [place] tooth extraction (Left, ); FESS (FUNCTIONAL ENDOSCOPIC SINUS SURGERY) (Right, 2018); ETHMOIDECTOMY (Right, 2018); SINUSOTOMY, FRONTAL SINUS, OBLITERATIVE (Right,  2018); and MAXILLARY ANTROSTOMY, AT INFERIOR NASAL MEATUS (Right, 2018).  -     She  reports that  has never smoked. she has never used smokeless tobacco. She reports that she does not drink alcohol or use drugs.  -     Her family history includes Asthma in her brother; Migraines in her brother and mother.  -     She is allergic to ciprofloxacin (bulk); grape; and pcn [penicillins].    Objective:   Physical Exam:  Vitals:  /88   Pulse 103   Wt 68.4 kg (150 lb 12.7 oz)   BMI 27.58 kg/m²   General appearance:  Well developed, well nourished    Eyes:  Extraocular motions intact, PERRL    Communication:  no hoarseness, no dysphonia    Ears:  Otoscopy of external auditory canals and tympanic membranes was normal, clinical speech reception thresholds grossly intact, no mass/lesion of auricle.  Nose:  No masses/lesions of external nose, nasal mucosa, septum, and turbinates were within normal limits.  Mouth:  No mass/lesion of lips, teeth, gums, hard/soft palate, tongue, tonsils, or oropharynx.    Cardiovascular:  No pedal edema; Radial Pulses +2     Neck & Lymphatics:  No cervical lymphadenopathy, no neck mass/crepitus/ asymmetry, trachea is midline, no thyroid enlargement/tenderness/mass.    Psych: Oriented x3,  Alert with normal mood and affect.     Respiration/Chest:  Symmetric expansion during respiration, normal respiratory effort.    Skin:  Warm and intact. No ulcerations of face, scalp, neck.      RAST Panel is insignificant for any allergies.       Assessment & Plan:     Nasonex; singulair; 6 months    Noe Manning MD.      Patient: Winter Sood [id number], :1990  Procedure date:10/26/2018  Patient's medications, allergies, past medical, surgical, social and family histories were reviewed and updated as appropriate.  Chief Complaint:  Follow-up (2 month )    HPI:  Winter is a 28 y.o. female with chronic sinusitis.      Procedure: Risks, benefits, and alternatives of the procedure  were discussed with the patient, and the patient consented to the nasal endoscopy.  The nasal cavity was sprayed with a topical decongestant and anesthetic (if needed). The endoscope was passed into each nostril and each nasal cavity was visualized.  On each side the nasal cavity, sinuses (if open), turbinates, and septum were examined with the findings described below. At the end of the examination, the scope was removed. The patient tolerated the procedure well with no complications.     Endoscopic Sinonasal Exam Findings:  -     Sinuses examined: right maxillary, right ethmoid anterior and posterior, right sphenoid and right frontal            The right sinus(es) have mild polypoid changes in inferior/posteroir ethmoids; otherwise normal mucosa            The left sinus(es) have normal mucosa  -     Nasal secretions: - no discolored secretions noted - bilaterally  -     Nasal septum: no significant deviation and no perforation appreciated   -     Inferior turbinate: - normal mucosa without significant hypertrophy - bilaterally  -     Middle turbinate: - normal mucosa without significant hypertrophy - bilaterally  -     Other findings: - no mass or obstructive lesion -    Assessment & Plan:  See today's clinic note

## 2018-10-26 ENCOUNTER — OFFICE VISIT (OUTPATIENT)
Dept: OTOLARYNGOLOGY | Facility: CLINIC | Age: 28
End: 2018-10-26
Payer: COMMERCIAL

## 2018-10-26 VITALS
WEIGHT: 150.81 LBS | HEART RATE: 103 BPM | SYSTOLIC BLOOD PRESSURE: 125 MMHG | DIASTOLIC BLOOD PRESSURE: 88 MMHG | BODY MASS INDEX: 27.58 KG/M2

## 2018-10-26 DIAGNOSIS — J32.2 CHRONIC ETHMOIDITIS: Primary | ICD-10-CM

## 2018-10-26 DIAGNOSIS — J32.1 CHRONIC FRONTAL SINUSITIS: ICD-10-CM

## 2018-10-26 PROBLEM — J32.3 CHRONIC SPHENOIDAL SINUSITIS: Status: RESOLVED | Noted: 2018-06-13 | Resolved: 2018-10-26

## 2018-10-26 PROBLEM — J32.0 CHRONIC RIGHT MAXILLARY SINUSITIS: Status: RESOLVED | Noted: 2018-06-13 | Resolved: 2018-10-26

## 2018-10-26 PROCEDURE — 3008F BODY MASS INDEX DOCD: CPT | Mod: CPTII,S$GLB,, | Performed by: OTOLARYNGOLOGY

## 2018-10-26 PROCEDURE — 99213 OFFICE O/P EST LOW 20 MIN: CPT | Mod: 25,S$GLB,, | Performed by: OTOLARYNGOLOGY

## 2018-10-26 PROCEDURE — 31231 NASAL ENDOSCOPY DX: CPT | Mod: S$GLB,,, | Performed by: OTOLARYNGOLOGY

## 2018-10-26 PROCEDURE — 99999 PR PBB SHADOW E&M-EST. PATIENT-LVL III: CPT | Mod: PBBFAC,,, | Performed by: OTOLARYNGOLOGY

## 2018-10-26 RX ORDER — MOMETASONE FUROATE 50 UG/1
2 SPRAY, METERED NASAL DAILY
Qty: 17 G | Refills: 12 | Status: SHIPPED | OUTPATIENT
Start: 2018-10-26 | End: 2022-08-19

## 2018-10-26 RX ORDER — MONTELUKAST SODIUM 10 MG/1
10 TABLET ORAL NIGHTLY
Qty: 30 TABLET | Refills: 12 | Status: SHIPPED | OUTPATIENT
Start: 2018-10-26 | End: 2018-11-25

## 2019-04-29 NOTE — PROGRESS NOTES
Subjective:   Patient: Winter Sood [id number], :1990   Visit date:2019 12:24 PM    Chief Complaint: Status post sinus surgery    HPI:  Winter is a 29 y.o. female with Chronic sinusitis.    Subjective: Patient underwent FESS in 2018. Cultures returned +pseudomonas, she was tx with Cipro x 3 weeks.  Prior RAST negative.  CBC with no eosinophilia.  She was using budesonide/tobra rinses .  Using Nasonex and taking Singulair. Returned to clinic on 19 and reported that she has generally been doing well.  She had some mild nasal drainage.      Surgery: 18    Sinuses operated/OR findings:   Right maxillary, frontal, ethmoid, sphenoid    Thick glue like mucus in right maxillary    Final path:   FINAL PATHOLOGIC DIAGNOSIS  Right nasal contents:  Bone fragments and respiratory mucosa with lymphocytic inflammation;  No significant eosinophilic component.  [facility]  Diagnosed by: Sherif Biggs M.D.  (Electronically Signed: 2018-06-15 15:21:26)    OR Culture: Proprionobacterium        Review of Systems:  -     Allergic/Immunologic: is allergic to ciprofloxacin (bulk); grape; and pcn [penicillins]..  -     Constitutional: Current temp:      Her meds, allergies, medical, surgical, social & family histories were reviewed & updated:  -     She has a current medication list which includes the following prescription(s): mometasone.  -     She  has a past medical history of Headache.   -     She does not have any pertinent problems on file.   -     She  has a past surgical history that includes [place] tooth extraction (Left, ); Functional endoscopic sinus surgery (FESS) (Right, 2018); Ethmoidectomy (Right, 2018); Frontal sinus obliteration (Right, 2018); and Antrostomy of maxillary sinus at inferior nasal meatus (Right, 2018).  -     She  reports that she has never smoked. She has never used smokeless tobacco. She reports that she does not drink alcohol or use drugs.  -     Her  family history includes Asthma in her brother; Migraines in her brother and mother.  -     She is allergic to ciprofloxacin (bulk); grape; and pcn [penicillins].    Objective:   Physical Exam:  Vitals:  There were no vitals taken for this visit.  General appearance:  Well developed, well nourished    Eyes:  Extraocular motions intact, PERRL    Communication:  no hoarseness, no dysphonia    Ears:  Otoscopy of external auditory canals and tympanic membranes was normal, clinical speech reception thresholds grossly intact, no mass/lesion of auricle.  Nose:  No masses/lesions of external nose, nasal mucosa, septum, and turbinates were within normal limits.  Mouth:  No mass/lesion of lips, teeth, gums, hard/soft palate, tongue, tonsils, or oropharynx.    Cardiovascular:  No pedal edema; Radial Pulses +2     Neck & Lymphatics:  No cervical lymphadenopathy, no neck mass/crepitus/ asymmetry, trachea is midline, no thyroid enlargement/tenderness/mass.    Psych: Oriented x3,  Alert with normal mood and affect.     Respiration/Chest:  Symmetric expansion during respiration, normal respiratory effort.    Skin:  Warm and intact. No ulcerations of face, scalp, neck.      RAST Panel is insignificant for any allergies.       Assessment & Plan:       CRS appears completely resolved  VINOD Manning MD      Patient: Winter Sood [id number], :1990  Procedure date:2019  Patient's medications, allergies, past medical, surgical, social and family histories were reviewed and updated as appropriate.  Chief Complaint:  6 month follow up    HPI:  Winter is a 29 y.o. female with chronic sinusitis.      Procedure: Risks, benefits, and alternatives of the procedure were discussed with the patient, and the patient consented to the nasal endoscopy.  The nasal cavity was sprayed with a topical decongestant and anesthetic (if needed). The endoscope was passed into each nostril and each nasal cavity was visualized.  On each  side the nasal cavity, sinuses (if open), turbinates, and septum were examined with the findings described below. At the end of the examination, the scope was removed. The patient tolerated the procedure well with no complications.     Endoscopic Sinonasal Exam Findings:  -     Sinuses examined: right maxillary, right ethmoid anterior and posterior, right sphenoid and right frontal            The right sinus(es) have normal mucosa            The left sinus(es) have n/a - exam not performed  -     Nasal secretions: - no discolored secretions noted - on the right  -     Nasal septum: no significant deviation and no perforation appreciated   -     Inferior turbinate: - normal mucosa without significant hypertrophy - on the right  -     Middle turbinate: - normal mucosa without significant hypertrophy - bilaterally  -     Other findings: - no mass or obstructive lesion -    Assessment & Plan:  See today's clinic note

## 2019-05-02 ENCOUNTER — OFFICE VISIT (OUTPATIENT)
Dept: OTOLARYNGOLOGY | Facility: CLINIC | Age: 29
End: 2019-05-02
Payer: COMMERCIAL

## 2019-05-02 VITALS
HEART RATE: 92 BPM | BODY MASS INDEX: 26.09 KG/M2 | HEIGHT: 62 IN | DIASTOLIC BLOOD PRESSURE: 82 MMHG | SYSTOLIC BLOOD PRESSURE: 109 MMHG | TEMPERATURE: 98 F | WEIGHT: 141.75 LBS

## 2019-05-02 DIAGNOSIS — J32.2 CHRONIC ETHMOIDITIS: ICD-10-CM

## 2019-05-02 DIAGNOSIS — J32.1 CHRONIC FRONTAL SINUSITIS: ICD-10-CM

## 2019-05-02 DIAGNOSIS — J32.4 CHRONIC PANSINUSITIS: ICD-10-CM

## 2019-05-02 DIAGNOSIS — Z98.890 S/P FESS (FUNCTIONAL ENDOSCOPIC SINUS SURGERY): Primary | ICD-10-CM

## 2019-05-02 PROCEDURE — 99213 PR OFFICE/OUTPT VISIT, EST, LEVL III, 20-29 MIN: ICD-10-PCS | Mod: 25,S$GLB,, | Performed by: OTOLARYNGOLOGY

## 2019-05-02 PROCEDURE — 3008F BODY MASS INDEX DOCD: CPT | Mod: CPTII,S$GLB,, | Performed by: OTOLARYNGOLOGY

## 2019-05-02 PROCEDURE — 99999 PR PBB SHADOW E&M-EST. PATIENT-LVL III: CPT | Mod: PBBFAC,,, | Performed by: OTOLARYNGOLOGY

## 2019-05-02 PROCEDURE — 99999 PR PBB SHADOW E&M-EST. PATIENT-LVL III: ICD-10-PCS | Mod: PBBFAC,,, | Performed by: OTOLARYNGOLOGY

## 2019-05-02 PROCEDURE — 99213 OFFICE O/P EST LOW 20 MIN: CPT | Mod: 25,S$GLB,, | Performed by: OTOLARYNGOLOGY

## 2019-05-02 PROCEDURE — 31231 NASAL ENDOSCOPY DX: CPT | Mod: S$GLB,,, | Performed by: OTOLARYNGOLOGY

## 2019-05-02 PROCEDURE — 3008F PR BODY MASS INDEX (BMI) DOCUMENTED: ICD-10-PCS | Mod: CPTII,S$GLB,, | Performed by: OTOLARYNGOLOGY

## 2019-05-02 PROCEDURE — 31231 PR NASAL ENDOSCOPY, DX: ICD-10-PCS | Mod: S$GLB,,, | Performed by: OTOLARYNGOLOGY

## 2022-04-07 PROBLEM — E28.2 PCOS (POLYCYSTIC OVARIAN SYNDROME): Status: ACTIVE | Noted: 2022-04-07

## 2022-05-11 ENCOUNTER — OFFICE VISIT (OUTPATIENT)
Dept: DERMATOLOGY | Facility: CLINIC | Age: 32
End: 2022-05-11
Payer: COMMERCIAL

## 2022-05-11 DIAGNOSIS — L70.0 ACNE VULGARIS: Primary | ICD-10-CM

## 2022-05-11 DIAGNOSIS — L21.9 SEBORRHEIC DERMATITIS OF SCALP: ICD-10-CM

## 2022-05-11 DIAGNOSIS — L98.9 SKIN LESION: ICD-10-CM

## 2022-05-11 DIAGNOSIS — D22.9 NEVUS: ICD-10-CM

## 2022-05-11 PROCEDURE — 99999 PR PBB SHADOW E&M-EST. PATIENT-LVL III: CPT | Mod: PBBFAC,,, | Performed by: PHYSICIAN ASSISTANT

## 2022-05-11 PROCEDURE — 99203 OFFICE O/P NEW LOW 30 MIN: CPT | Mod: S$GLB,,, | Performed by: PHYSICIAN ASSISTANT

## 2022-05-11 PROCEDURE — 99203 PR OFFICE/OUTPT VISIT, NEW, LEVL III, 30-44 MIN: ICD-10-PCS | Mod: S$GLB,,, | Performed by: PHYSICIAN ASSISTANT

## 2022-05-11 PROCEDURE — 99999 PR PBB SHADOW E&M-EST. PATIENT-LVL III: ICD-10-PCS | Mod: PBBFAC,,, | Performed by: PHYSICIAN ASSISTANT

## 2022-05-11 RX ORDER — FLUOCINOLONE ACETONIDE 0.1 MG/ML
SOLUTION TOPICAL
Qty: 60 ML | Refills: 3 | Status: SHIPPED | OUTPATIENT
Start: 2022-05-11 | End: 2023-12-22

## 2022-05-11 RX ORDER — TRETINOIN 0.25 MG/G
CREAM TOPICAL
Qty: 20 G | Refills: 4 | Status: SHIPPED | OUTPATIENT
Start: 2022-05-11 | End: 2023-05-11

## 2022-05-11 RX ORDER — KETOCONAZOLE 20 MG/ML
SHAMPOO, SUSPENSION TOPICAL
Qty: 240 ML | Refills: 5 | Status: SHIPPED | OUTPATIENT
Start: 2022-05-11 | End: 2022-08-31 | Stop reason: SDUPTHER

## 2022-05-11 RX ORDER — CLINDAMYCIN PHOSPHATE 10 MG/G
GEL TOPICAL
Qty: 30 G | Refills: 5 | Status: SHIPPED | OUTPATIENT
Start: 2022-05-11 | End: 2023-06-05 | Stop reason: SDUPTHER

## 2022-05-11 NOTE — PROGRESS NOTES
Subjective:       Patient ID:  Winter Sood is a 32 y.o. female who presents for   Chief Complaint   Patient presents with    Mole     C/o mole under left armpit, changing in size    Acne     C/o acne on shoulders, back and chest     History of Present Illness: The patient presents with chief complaint of moles.  Location: left armpit  Duration: few years  Signs/Symptoms: change in size, started as flat freckle, but has become more raised. Denies change in color or bleeding. Occasional itching.    Prior treatments: none    History of Present Illness: The patient presents with chief complaint of acne.  Location: shoulders, chest, back, face  Duration: few years  Signs/Symptoms: cystic, pimples, scarring (discoloration and textural), Flares are daily. Menses are regular, denies noemy worsening w/menses. Endorse sensitive skin. +Combination skin.   had vasectomy    Prior treatments: various otc acne washes, creams, and ProActiv (irritation and burns)    Denies personal or FHX of skin CA    PMHX: +PCOS      Review of Systems     Objective:    Physical Exam   Constitutional: She appears well-developed and well-nourished. No distress.   Neurological: She is alert and oriented to person, place, and time. She is not disoriented.   Psychiatric: She has a normal mood and affect.   Skin:   Areas Examined (abnormalities noted in diagram):   Scalp / Hair Palpated and Inspected  Head / Face Inspection Performed  Neck Inspection Performed  Chest / Axilla Inspection Performed  Back Inspection Performed  RUE Inspected  LUE Inspection Performed                       Diagram Legend     Erythematous scaling macule/papule c/w actinic keratosis       Vascular papule c/w angioma      Pigmented verrucoid papule/plaque c/w seborrheic keratosis      Yellow umbilicated papule c/w sebaceous hyperplasia      Irregularly shaped tan macule c/w lentigo     1-2 mm smooth white papules consistent with Milia      Movable  subcutaneous cyst with punctum c/w epidermal inclusion cyst      Subcutaneous movable cyst c/w pilar cyst      Firm pink to brown papule c/w dermatofibroma      Pedunculated fleshy papule(s) c/w skin tag(s)      Evenly pigmented macule c/w junctional nevus     Mildly variegated pigmented, slightly irregular-bordered macule c/w mildly atypical nevus      Flesh colored to evenly pigmented papule c/w intradermal nevus       Pink pearly papule/plaque c/w basal cell carcinoma      Erythematous hyperkeratotic cursted plaque c/w SCC      Surgical scar with no sign of skin cancer recurrence      Open and closed comedones      Inflammatory papules and pustules      Verrucoid papule consistent consistent with wart     Erythematous eczematous patches and plaques     Dystrophic onycholytic nail with subungual debris c/w onychomycosis     Umbilicated papule    Erythematous-base heme-crusted tan verrucoid plaque consistent with inflamed seborrheic keratosis     Erythematous Silvery Scaling Plaque c/w Psoriasis     See annotation      Assessment / Plan:        Acne vulgaris  -     tretinoin (RETIN-A) 0.025 % cream; Apply a pea-sized amount to the entire face at bedtime.  Use every third night and increase as tolerated to nightly.  Dispense: 20 g; Refill: 4  -     clindamycin phosphate 1% (CLINDAGEL) 1 % gel; AAA thin layer to face in the morning.  Dispense: 30 g; Refill: 5  likley a hormonal component given distribution and h/o PCOS. Discussed potential OCP vs. Spironolactone but she will discuss further w/gyn later today. Start above rx topicals.      Skin lesion  -     Ambulatory referral/consult to Dermatology    Seborrheic dermatitis of scalp  -     ketoconazole (NIZORAL) 2 % shampoo; Shampoo scalp 1-2 times per week. Lather x 5 minutes then rinse well.  Dispense: 240 mL; Refill: 5  -     fluocinolone (SYNALAR) 0.01 % external solution; AAA of scalp twice a day PRN flares.  Dispense: 60 mL; Refill: 3  Ddx: scalp PsO vs.  other  Mild, trial of above rx meds. Discussed waxing and waning.    Nevus  Homogenous, circumscribed, less than 5 mm diameter. Agree to monitor. Reviewed ABCDEFs of moles.          Follow up in about 3 months (around 8/11/2022) for acne, seborrheic dermatitis.

## 2022-08-31 ENCOUNTER — OFFICE VISIT (OUTPATIENT)
Dept: DERMATOLOGY | Facility: CLINIC | Age: 32
End: 2022-08-31
Payer: COMMERCIAL

## 2022-08-31 DIAGNOSIS — L21.9 SEBORRHEIC DERMATITIS OF SCALP: ICD-10-CM

## 2022-08-31 DIAGNOSIS — L70.0 ACNE VULGARIS: Primary | ICD-10-CM

## 2022-08-31 PROCEDURE — 99214 PR OFFICE/OUTPT VISIT, EST, LEVL IV, 30-39 MIN: ICD-10-PCS | Mod: 95,,, | Performed by: PHYSICIAN ASSISTANT

## 2022-08-31 PROCEDURE — 99214 OFFICE O/P EST MOD 30 MIN: CPT | Mod: 95,,, | Performed by: PHYSICIAN ASSISTANT

## 2022-08-31 RX ORDER — KETOCONAZOLE 20 MG/ML
SHAMPOO, SUSPENSION TOPICAL
Qty: 240 ML | Refills: 5 | Status: SHIPPED | OUTPATIENT
Start: 2022-08-31 | End: 2023-12-22

## 2022-08-31 RX ORDER — DOXYCYCLINE 100 MG/1
CAPSULE ORAL
Qty: 30 CAPSULE | Refills: 2 | Status: SHIPPED | OUTPATIENT
Start: 2022-08-31 | End: 2022-10-28

## 2022-08-31 NOTE — PROGRESS NOTES
Subjective:       Patient ID:  Winter Sood is a 32 y.o. female who presents for No chief complaint on file.      The patient location is: [place], [place]  The chief complaint leading to consultation is: acne f/u    Visit type: audiovisual    Face to Face time with patient: 15 minutes  20 minutes of total time spent on the encounter, which includes face to face time and non-face to face time preparing to see the patient (eg, review of tests), Obtaining and/or reviewing separately obtained history, Documenting clinical information in the electronic or other health record, Independently interpreting results (not separately reported) and communicating results to the patient/family/caregiver, or Care coordination (not separately reported).         Each patient to whom he or she provides medical services by telemedicine is:  (1) informed of the relationship between the physician and patient and the respective role of any other health care provider with respect to management of the patient; and (2) notified that he or she may decline to receive medical services by telemedicine and may withdraw from such care at any time.    Notes:   Hx of acne (likely hormonal component), Seb derm of scalp, and nevus of left axilla. Last seen 5/11/22. For acne, using clinda gel qd and tretinoin 0.025% cream qod (dryness if more frequent). Overall improvement in that lesions heal faster and less scarring, but she notes persistent deep acne flares of hairline and chin almost daily.  s/p vasectomy. She wishes to avoid OCP tx for hormonal acne as she states she has had side effects w/OCP's in past.     For presumed seb derm of scalp, using keto shampoo 2 times weekly, not using fluocinolone very often. She notes marked improvement intially w/shampoo, but flares seem to be more frequent now. Shampoos scalp 2 times weekly. +dandruff, flaking, itching  \  Previoust tx: various otc dandruff shampoos, t gel otc (helps  temporarily)      Review of Systems   Constitutional:  Negative for fever and chills.   Gastrointestinal:  Negative for nausea and vomiting.   Skin:  Positive for itching, dry skin and activity-related sunscreen use. Negative for rash, sun sensitivity, daily sunscreen use, recent sunburn, dry lips and abscesses.   Hematologic/Lymphatic: Does not bruise/bleed easily.      Objective:    Physical Exam   Constitutional: She appears well-developed and well-nourished. No distress.   Neurological: She is alert and oriented to person, place, and time. She is not disoriented.   Psychiatric: She has a normal mood and affect.   Skin:   Areas Examined (abnormalities noted in diagram):   Head / Face Inspection Performed  Neck Inspection Performed  Chest / Axilla Inspection Performed  RUE Inspected  LUE Inspection Performed            Diagram Legend     Erythematous scaling macule/papule c/w actinic keratosis       Vascular papule c/w angioma      Pigmented verrucoid papule/plaque c/w seborrheic keratosis      Yellow umbilicated papule c/w sebaceous hyperplasia      Irregularly shaped tan macule c/w lentigo     1-2 mm smooth white papules consistent with Milia      Movable subcutaneous cyst with punctum c/w epidermal inclusion cyst      Subcutaneous movable cyst c/w pilar cyst      Firm pink to brown papule c/w dermatofibroma      Pedunculated fleshy papule(s) c/w skin tag(s)      Evenly pigmented macule c/w junctional nevus     Mildly variegated pigmented, slightly irregular-bordered macule c/w mildly atypical nevus      Flesh colored to evenly pigmented papule c/w intradermal nevus       Pink pearly papule/plaque c/w basal cell carcinoma      Erythematous hyperkeratotic cursted plaque c/w SCC      Surgical scar with no sign of skin cancer recurrence      Open and closed comedones      Inflammatory papules and pustules      Verrucoid papule consistent consistent with wart     Erythematous eczematous patches and plaques      Dystrophic onycholytic nail with subungual debris c/w onychomycosis     Umbilicated papule    Erythematous-base heme-crusted tan verrucoid plaque consistent with inflamed seborrheic keratosis     Erythematous Silvery Scaling Plaque c/w Psoriasis     See annotation      Assessment / Plan:        Acne vulgaris  Improving, but still w/cystic flares. Agree to trial of doxy qd. Will increase clindagel from qd to bid and continue tretinoin qhs as tolerated. May reconsider spironolactone in future if not improved. Briefly discussed side effects and monitoring w/spironolactone. Side effect profile of doxy reviewed including increased sun sensitivity, esophageal ulceration, and upset stomach.  Patient was instructed to not become pregnant while on medication due to effects on dental development in fetus; she acknowledged understanding of risks involved.     Seborrheic dermatitis of scalp  -     doxycycline (MONODOX) 100 MG capsule; Take once daily with food.  May cause upset stomach.  Dispense: 30 capsule; Refill: 2  -     ketoconazole (NIZORAL) 2 % shampoo; Shampoo scalp 1-2 times per week. Lather x 5 minutes then rinse well.  Dispense: 240 mL; Refill: 5  Still w/frequent flares. Encoruaged to increase frequency of shampoos to 3 times weekly. Continue keto shampoo 2 times weekly. May consider using otc dandruff shampoo once weekly. Encouraged compliance w/fluocinolone solution prn itching / flares. May reconsider trial of clobex shampoo in future.          Follow up in about 3 months (around 11/30/2022) for acne, seborrheic dermatitis.

## 2022-09-05 PROBLEM — D50.9 IDA (IRON DEFICIENCY ANEMIA): Status: ACTIVE | Noted: 2022-09-05

## 2022-09-05 PROBLEM — E53.8 BIOTIN DEFICIENCY: Status: ACTIVE | Noted: 2022-09-05

## 2022-09-05 PROBLEM — E78.2 MIXED HYPERLIPIDEMIA: Status: ACTIVE | Noted: 2022-09-05

## 2022-10-26 NOTE — TELEPHONE ENCOUNTER
----- Message from William Phillips sent at 6/7/2018 11:37 AM CDT -----  Pt states she sent her CT images in [facility].        Please call pt back at [phone]  
day(s)

## 2023-06-05 DIAGNOSIS — L70.0 ACNE VULGARIS: ICD-10-CM

## 2023-06-05 RX ORDER — CLINDAMYCIN PHOSPHATE 10 MG/G
GEL TOPICAL
Qty: 30 G | Refills: 5 | Status: SHIPPED | OUTPATIENT
Start: 2023-06-05 | End: 2023-12-22

## 2023-06-05 NOTE — TELEPHONE ENCOUNTER
Refill rx sent to [facility] Fernando PA-C     ----- Message from Brittanie Roman sent at 6/5/2023  8:51 AM CDT -----  Who Called: pt    Refill or New Rx.: new    clindamycin phosphate 1% (CLINDAGEL) 1 % gel 30 g 5 5/11/2022  --  Sig: AAA thin layer to face in the morning.  Sent to pharmacy as: clindamycin phosphate 1% (CLINDAGEL) 1 % gel  Class: Normal        Preferred Pharmacy with phone number:     [facility] - [facility] [address], [facility] [place]  [address], [facility] [place]  [facility] [address]  Phone: [phone] Fax: [phone]        Local or Mail Order: local    Ordering Provider: mila Berkowitz Call Back Number: [phone]       Additional Information:

## 2024-01-04 ENCOUNTER — HOSPITAL ENCOUNTER (OUTPATIENT)
Dept: RADIOLOGY | Facility: HOSPITAL | Age: 34
Discharge: HOME | End: 2024-01-04
Attending: INTERNAL MEDICINE
Payer: COMMERCIAL

## 2024-01-04 ENCOUNTER — OFFICE VISIT (OUTPATIENT)
Dept: RHEUMATOLOGY | Facility: CLINIC | Age: 34
End: 2024-01-04
Payer: COMMERCIAL

## 2024-01-04 ENCOUNTER — PATIENT MESSAGE (OUTPATIENT)
Dept: RHEUMATOLOGY | Facility: CLINIC | Age: 34
End: 2024-01-04

## 2024-01-04 VITALS
BODY MASS INDEX: 28.44 KG/M2 | SYSTOLIC BLOOD PRESSURE: 121 MMHG | HEIGHT: 62 IN | DIASTOLIC BLOOD PRESSURE: 83 MMHG | WEIGHT: 154.56 LBS | HEART RATE: 116 BPM

## 2024-01-04 DIAGNOSIS — G89.29 CHRONIC LOW BACK PAIN, UNSPECIFIED BACK PAIN LATERALITY, UNSPECIFIED WHETHER SCIATICA PRESENT: ICD-10-CM

## 2024-01-04 DIAGNOSIS — E55.9 VITAMIN D INSUFFICIENCY: ICD-10-CM

## 2024-01-04 DIAGNOSIS — R76.8 ANA POSITIVE: ICD-10-CM

## 2024-01-04 DIAGNOSIS — M22.2X9 DISORDER OF PATELLOFEMORAL JOINT, UNSPECIFIED LATERALITY: ICD-10-CM

## 2024-01-04 DIAGNOSIS — M25.50 HYPERMOBILITY ARTHRALGIA: ICD-10-CM

## 2024-01-04 DIAGNOSIS — M47.819 SPONDYLOARTHRITIS: Primary | ICD-10-CM

## 2024-01-04 DIAGNOSIS — R53.83 FATIGUE, UNSPECIFIED TYPE: ICD-10-CM

## 2024-01-04 DIAGNOSIS — L21.9 SEBORRHEIC DERMATITIS: ICD-10-CM

## 2024-01-04 DIAGNOSIS — R20.2 PARESTHESIAS: ICD-10-CM

## 2024-01-04 DIAGNOSIS — M47.819 SPONDYLOARTHRITIS: ICD-10-CM

## 2024-01-04 DIAGNOSIS — M54.50 CHRONIC LOW BACK PAIN, UNSPECIFIED BACK PAIN LATERALITY, UNSPECIFIED WHETHER SCIATICA PRESENT: ICD-10-CM

## 2024-01-04 DIAGNOSIS — M25.569 KNEE PAIN, UNSPECIFIED CHRONICITY, UNSPECIFIED LATERALITY: ICD-10-CM

## 2024-01-04 DIAGNOSIS — M25.50 ARTHRALGIA, UNSPECIFIED JOINT: ICD-10-CM

## 2024-01-04 PROCEDURE — 72200 X-RAY EXAM SI JOINTS: CPT | Mod: TC

## 2024-01-04 PROCEDURE — 3079F DIAST BP 80-89 MM HG: CPT | Mod: CPTII,S$GLB,, | Performed by: INTERNAL MEDICINE

## 2024-01-04 PROCEDURE — 99999 PR PBB SHADOW E&M-EST. PATIENT-LVL IV: CPT | Mod: PBBFAC,,, | Performed by: INTERNAL MEDICINE

## 2024-01-04 PROCEDURE — 99205 OFFICE O/P NEW HI 60 MIN: CPT | Mod: S$GLB,,, | Performed by: INTERNAL MEDICINE

## 2024-01-04 PROCEDURE — 3074F SYST BP LT 130 MM HG: CPT | Mod: CPTII,S$GLB,, | Performed by: INTERNAL MEDICINE

## 2024-01-04 PROCEDURE — 1159F MED LIST DOCD IN RCRD: CPT | Mod: CPTII,S$GLB,, | Performed by: INTERNAL MEDICINE

## 2024-01-04 PROCEDURE — 72200 X-RAY EXAM SI JOINTS: CPT | Mod: 26,,, | Performed by: RADIOLOGY

## 2024-01-04 PROCEDURE — 3008F BODY MASS INDEX DOCD: CPT | Mod: CPTII,S$GLB,, | Performed by: INTERNAL MEDICINE

## 2024-01-04 RX ORDER — CLINDAMYCIN HYDROCHLORIDE 300 MG/1
300 CAPSULE ORAL 3 TIMES DAILY
COMMUNITY
Start: 2023-12-30

## 2024-01-04 RX ORDER — NAPROXEN 500 MG/1
500 TABLET ORAL 2 TIMES DAILY WITH MEALS
Qty: 28 TABLET | Refills: 1 | Status: SHIPPED | OUTPATIENT
Start: 2024-01-04 | End: 2024-01-18

## 2024-01-04 RX ORDER — METHOCARBAMOL 500 MG/1
500 TABLET, FILM COATED ORAL NIGHTLY PRN
Qty: 40 TABLET | Refills: 0 | Status: SHIPPED | OUTPATIENT
Start: 2024-01-04 | End: 2024-02-03

## 2024-01-04 NOTE — PROGRESS NOTES
RHEUMATOLOGY OUTPATIENT CLINIC NOTE    2024    Attending Rheumatologist: Hans Taylor  Primary Care Provider/Physician Requesting Consultation: Magy Cheng MD   Chief Complaint/Reason For Consultation:  Positive CIARA and Knee Pain      Subjective:     Winter Sood is a 33 y.o. White female with +CIARA and chronic pain     Main concern chronic widespread arthralgias. Describes some inflammatory features of back pain.    Addendum : XR SI j equivocal for SpA: Degenerative changes of the bilateral SI joints.  Left-sided sacralization of L5 reported.  Need MRI to determine if early inflammatory sacroiliitis.    Review of Systems   Constitutional:  Negative for fever.   HENT:          Mild xerostomia.  Denies oral or nasal mucosa ulcerations   Eyes:  Negative for photophobia and pain (no hx of uveitis or scleritis).   Respiratory:  Negative for cough and shortness of breath.    Cardiovascular:  Negative for chest pain.   Gastrointestinal:  Positive for abdominal pain, constipation and diarrhea. Negative for blood in stool and melena.        No hx of IBD   Genitourinary:  Negative for dysuria, hematuria and urgency.        No hx of nephrolithiasis   Musculoskeletal:  Positive for back pain, joint pain and neck pain.   Skin:  Negative for rash.        History of seborrheic dermatitis.  Questionable scalp PsO. Incomplete RP   Neurological:  Negative for focal weakness and weakness.   Endo/Heme/Allergies:         Denies recurrent pregnancy loss after 3 months gestation.  No hx preeclampsia.  No hx of  lupus. No hx of DVT/PE       Chronic comorbid conditions affecting medical decision making today:  Past Medical History:   Diagnosis Date    Headache      Past Surgical History:   Procedure Laterality Date    ANTROSTOMY OF MAXILLARY SINUS AT INFERIOR NASAL MEATUS Right 2018    Procedure: MAXILLARY ANTROSTOMY, AT INFERIOR NASAL MEATUS;  Surgeon: Noe Manning MD;  Location: [facility] OR;   Service: ENT;  Laterality: Right;    ETHMOIDECTOMY Right 6/13/2018    Procedure: ETHMOIDECTOMY;  Surgeon: Noe Manning MD;  Location: [facility] OR;  Service: ENT;  Laterality: Right;    FRONTAL SINUS OBLITERATION Right 6/13/2018    Procedure: SINUSOTOMY, FRONTAL SINUS, OBLITERATIVE;  Surgeon: Noe Manning MD;  Location: [facility] OR;  Service: ENT;  Laterality: Right;    FUNCTIONAL ENDOSCOPIC SINUS SURGERY (FESS) Right 6/13/2018    Procedure: FESS (FUNCTIONAL ENDOSCOPIC SINUS SURGERY);  Surgeon: Noe Manning MD;  Location: [facility] OR;  Service: ENT;  Laterality: Right;    WISDOM TOOTH EXTRACTION Left 2014     Family History   Problem Relation Age of Onset    Migraines Mother     Migraines Brother     Asthma Brother     Cancer Neg Hx     Thyroid disease Neg Hx      Social History     Tobacco Use   Smoking Status Never   Smokeless Tobacco Never       Current Outpatient Medications:     clindamycin (CLEOCIN) 300 MG capsule, Take 300 mg by mouth 3 (three) times daily., Disp: , Rfl:     meloxicam (MOBIC) 7.5 MG tablet, Take 1 tablet (7.5 mg total) by mouth 2 (two) times daily as needed for Pain. (Patient not taking: Reported on 1/4/2024), Disp: 60 tablet, Rfl: 1     Objective:     Vitals:    01/04/24 1503   BP: 121/83   Pulse: (!) 116     Physical Exam   Eyes: Conjunctivae are normal.   Pulmonary/Chest: Effort normal. No respiratory distress.   Musculoskeletal:         General: Tenderness present. No swelling.   Neurological: She displays no weakness.   Skin: No rash noted.       Reviewed available old and all outside pertinent medical records available.    All lab results personally reviewed and interpreted by me.       ASSESSMENT      Encounter Diagnoses   Name Primary?    Spondyloarthritis Yes    Seborrheic dermatitis     Knee pain, unspecified chronicity, unspecified laterality     Disorder of patellofemoral joint, unspecified laterality     Hypermobility arthralgia     CIARA positive     Paresthesias       PLAN     - chronic arthralgias  and back pain w/ mixed pain characteristics.   - mild allodynia on exam.  TTP several enthesis.  Seborrheic dermatitis present, no active inflammatory rashes noted (recovering from skin infection and reaction to topical abx left side of scalp)  - Elevated CIARA, negative LIBORIO panel.  No concerning cytopenias or past nephropathy  - XR on files w/o radiographic evidence of chronic inflammatory arthritis.  - impression of hypermobility arthralgias, chronic back pain, with FMS sx.    - Recommend w/u for SLS, SjS, RA, SpA.  Screen for vit D insuff.  - Trial of Naprosyn q12h for 2 weeks.  Robaxin PRN.  PT referral provided.  - repeat labs to monitor for end organ damage related to serologic abnormality.      Hans Taylor M.D.

## 2024-01-10 ENCOUNTER — LAB VISIT (OUTPATIENT)
Dept: LAB | Facility: HOSPITAL | Age: 34
End: 2024-01-10
Attending: INTERNAL MEDICINE
Payer: COMMERCIAL

## 2024-01-10 DIAGNOSIS — M47.819 SPONDYLOARTHRITIS: ICD-10-CM

## 2024-01-10 DIAGNOSIS — R76.8 ANA POSITIVE: ICD-10-CM

## 2024-01-10 LAB
25(OH)D3+25(OH)D2 SERPL-MCNC: 52 NG/ML (ref 30–96)
ALBUMIN SERPL BCP-MCNC: 4.1 G/DL (ref 3.5–5.2)
ALP SERPL-CCNC: 54 U/L (ref 55–135)
ALT SERPL W/O P-5'-P-CCNC: 19 U/L (ref 10–44)
ANION GAP SERPL CALC-SCNC: 11 MMOL/L (ref 8–16)
AST SERPL-CCNC: 19 U/L (ref 10–40)
BASOPHILS # BLD AUTO: 0.05 K/UL (ref 0–0.2)
BASOPHILS NFR BLD: 0.9 % (ref 0–1.9)
BILIRUB SERPL-MCNC: 0.9 MG/DL (ref 0.1–1)
BUN SERPL-MCNC: 15 MG/DL (ref 6–20)
C3 SERPL-MCNC: 117 MG/DL (ref 50–180)
C4 SERPL-MCNC: 26 MG/DL (ref 11–44)
CALCIUM SERPL-MCNC: 8.9 MG/DL (ref 8.7–10.5)
CHLORIDE SERPL-SCNC: 103 MMOL/L (ref 95–110)
CO2 SERPL-SCNC: 24 MMOL/L (ref 23–29)
CREAT SERPL-MCNC: 0.8 MG/DL (ref 0.5–1.4)
CREAT UR-MCNC: 116.8 MG/DL (ref 15–325)
DIFFERENTIAL METHOD BLD: ABNORMAL
EOSINOPHIL # BLD AUTO: 0.2 K/UL (ref 0–0.5)
EOSINOPHIL NFR BLD: 2.8 % (ref 0–8)
ERYTHROCYTE [DISTWIDTH] IN BLOOD BY AUTOMATED COUNT: 13.8 % (ref 11.5–14.5)
EST. GFR  (NO RACE VARIABLE): >60 ML/MIN/1.73 M^2
GLUCOSE SERPL-MCNC: 77 MG/DL (ref 70–110)
HCT VFR BLD AUTO: 41.3 % (ref 37–48.5)
HGB BLD-MCNC: 13.6 G/DL (ref 12–16)
IMM GRANULOCYTES # BLD AUTO: 0.01 K/UL (ref 0–0.04)
IMM GRANULOCYTES NFR BLD AUTO: 0.2 % (ref 0–0.5)
LYMPHOCYTES # BLD AUTO: 2.8 K/UL (ref 1–4.8)
LYMPHOCYTES NFR BLD: 48.7 % (ref 18–48)
MCH RBC QN AUTO: 28 PG (ref 27–31)
MCHC RBC AUTO-ENTMCNC: 32.9 G/DL (ref 32–36)
MCV RBC AUTO: 85 FL (ref 82–98)
MONOCYTES # BLD AUTO: 0.5 K/UL (ref 0.3–1)
MONOCYTES NFR BLD: 7.9 % (ref 4–15)
NEUTROPHILS # BLD AUTO: 2.3 K/UL (ref 1.8–7.7)
NEUTROPHILS NFR BLD: 39.5 % (ref 38–73)
NRBC BLD-RTO: 0 /100 WBC
PLATELET # BLD AUTO: 236 K/UL (ref 150–450)
PMV BLD AUTO: 10.6 FL (ref 9.2–12.9)
POTASSIUM SERPL-SCNC: 3.9 MMOL/L (ref 3.5–5.1)
PROT SERPL-MCNC: 7.8 G/DL (ref 6–8.4)
PROT UR-MCNC: 8 MG/DL (ref 0–15)
PROT/CREAT UR: 0.07 MG/G{CREAT} (ref 0–0.2)
RBC # BLD AUTO: 4.85 M/UL (ref 4–5.4)
SODIUM SERPL-SCNC: 138 MMOL/L (ref 136–145)
WBC # BLD AUTO: 5.73 K/UL (ref 3.9–12.7)

## 2024-01-10 PROCEDURE — 84156 ASSAY OF PROTEIN URINE: CPT | Performed by: INTERNAL MEDICINE

## 2024-01-10 PROCEDURE — 82306 VITAMIN D 25 HYDROXY: CPT | Performed by: INTERNAL MEDICINE

## 2024-01-10 PROCEDURE — 83516 IMMUNOASSAY NONANTIBODY: CPT | Mod: 59 | Performed by: INTERNAL MEDICINE

## 2024-01-10 PROCEDURE — 80053 COMPREHEN METABOLIC PANEL: CPT | Performed by: INTERNAL MEDICINE

## 2024-01-10 PROCEDURE — 36415 COLL VENOUS BLD VENIPUNCTURE: CPT | Mod: PO | Performed by: INTERNAL MEDICINE

## 2024-01-10 PROCEDURE — 85025 COMPLETE CBC W/AUTO DIFF WBC: CPT | Performed by: INTERNAL MEDICINE

## 2024-01-10 PROCEDURE — 86038 ANTINUCLEAR ANTIBODIES: CPT | Performed by: INTERNAL MEDICINE

## 2024-01-10 PROCEDURE — 86146 BETA-2 GLYCOPROTEIN ANTIBODY: CPT | Mod: 59 | Performed by: INTERNAL MEDICINE

## 2024-01-10 PROCEDURE — 86039 ANTINUCLEAR ANTIBODIES (ANA): CPT | Performed by: INTERNAL MEDICINE

## 2024-01-10 PROCEDURE — 86235 NUCLEAR ANTIGEN ANTIBODY: CPT | Mod: 59 | Performed by: INTERNAL MEDICINE

## 2024-01-10 PROCEDURE — 86160 COMPLEMENT ANTIGEN: CPT | Mod: 59 | Performed by: INTERNAL MEDICINE

## 2024-01-10 PROCEDURE — 85613 RUSSELL VIPER VENOM DILUTED: CPT | Performed by: INTERNAL MEDICINE

## 2024-01-10 PROCEDURE — 86147 CARDIOLIPIN ANTIBODY EA IG: CPT | Performed by: INTERNAL MEDICINE

## 2024-01-10 PROCEDURE — 86160 COMPLEMENT ANTIGEN: CPT | Performed by: INTERNAL MEDICINE

## 2024-01-11 LAB
ANA PATTERN 1: NORMAL
ANA SER QL IF: POSITIVE
ANA TITR SER IF: NORMAL {TITER}

## 2024-01-15 LAB
APTT IMM NP PPP: NORMAL SEC (ref 32–48)
APTT P HEP NEUT PPP: NORMAL SEC (ref 32–48)
B2 GLYCOPROT1 IGA SER QL: 3 U/ML
B2 GLYCOPROT1 IGG SER QL: 1.4 U/ML
B2 GLYCOPROT1 IGM SER QL: 2.8 U/ML
CARDIOLIPIN IGG SER IA-ACNC: <9.4 GPL (ref 0–14.99)
CARDIOLIPIN IGM SER IA-ACNC: <9.4 MPL (ref 0–12.49)
CONFIRM APTT STACLOT: NORMAL
DRVVT SCREEN TO CONFIRM RATIO: NORMAL RATIO
LA 3 SCREEN W REFLEX-IMP: NORMAL
LA APTT+DRVVT+PT W REFLEX PPP: NORMAL
MIXING DRVVT: NORMAL SEC (ref 33–44)
PROTHROMBIN TIME: 14.8 SEC (ref 12–15.5)
REPTILASE TIME: NORMAL SEC
SCREEN APTT: 40 SEC (ref 32–48)
SCREEN DRVVT: 38 SEC (ref 33–44)
THROMBIN TIME: NORMAL SEC (ref 14.7–19.5)

## 2024-01-16 LAB
ANTI SM ANTIBODY: 0.12 RATIO (ref 0–0.99)
ANTI SM/RNP ANTIBODY: 0.11 RATIO (ref 0–0.99)
ANTI-SM INTERPRETATION: NEGATIVE
ANTI-SM/RNP INTERPRETATION: NEGATIVE
ANTI-SSA ANTIBODY: 0.09 RATIO (ref 0–0.99)
ANTI-SSA INTERPRETATION: NEGATIVE
ANTI-SSB ANTIBODY: 0.07 RATIO (ref 0–0.99)
ANTI-SSB INTERPRETATION: NEGATIVE
DSDNA AB SER-ACNC: NORMAL [IU]/ML

## 2024-01-30 LAB
ANTI-PM/SCL AB: <20 UNITS
ANTI-SS-A 52 KD AB, IGG: <20 UNITS
EJ AB SER QL: NEGATIVE
ENA JO1 AB SER IA-ACNC: <20 UNITS
ENA SM+RNP AB SER IA-ACNC: <20 UNITS
FIBRILLARIN (U3 RNP): NEGATIVE
KU AB SER QL: NEGATIVE
MDA-5 (P140): <20 UNITS
MI2 AB SER QL: NEGATIVE
NXP-2 (P140): <20 UNITS
OJ AB SER QL: NEGATIVE
PL12 AB SER QL: NEGATIVE
PL7 AB SER QL: NEGATIVE
SRP AB SERPL QL: NEGATIVE
TIF1 GAMMA (P155/140): <20 UNITS
U2 SNRNP: NEGATIVE

## 2024-06-25 ENCOUNTER — LAB VISIT (OUTPATIENT)
Dept: LAB | Facility: HOSPITAL | Age: 34
End: 2024-06-25
Attending: INTERNAL MEDICINE
Payer: COMMERCIAL

## 2024-06-25 DIAGNOSIS — M47.819 SPONDYLOARTHRITIS: ICD-10-CM

## 2024-06-25 DIAGNOSIS — R76.8 ANA POSITIVE: ICD-10-CM

## 2024-06-25 LAB
ALBUMIN SERPL BCP-MCNC: 4.2 G/DL (ref 3.5–5.2)
ALP SERPL-CCNC: 52 U/L (ref 55–135)
ALT SERPL W/O P-5'-P-CCNC: 15 U/L (ref 10–44)
ANION GAP SERPL CALC-SCNC: 9 MMOL/L (ref 8–16)
AST SERPL-CCNC: 18 U/L (ref 10–40)
BASOPHILS # BLD AUTO: 0.05 K/UL (ref 0–0.2)
BASOPHILS NFR BLD: 0.8 % (ref 0–1.9)
BILIRUB SERPL-MCNC: 0.8 MG/DL (ref 0.1–1)
BUN SERPL-MCNC: 10 MG/DL (ref 6–20)
CALCIUM SERPL-MCNC: 9.8 MG/DL (ref 8.7–10.5)
CHLORIDE SERPL-SCNC: 105 MMOL/L (ref 95–110)
CO2 SERPL-SCNC: 24 MMOL/L (ref 23–29)
CREAT SERPL-MCNC: 0.8 MG/DL (ref 0.5–1.4)
CREAT UR-MCNC: 46 MG/DL (ref 15–325)
DIFFERENTIAL METHOD BLD: NORMAL
EOSINOPHIL # BLD AUTO: 0.1 K/UL (ref 0–0.5)
EOSINOPHIL NFR BLD: 2.4 % (ref 0–8)
ERYTHROCYTE [DISTWIDTH] IN BLOOD BY AUTOMATED COUNT: 13.4 % (ref 11.5–14.5)
EST. GFR  (NO RACE VARIABLE): >60 ML/MIN/1.73 M^2
GLUCOSE SERPL-MCNC: 90 MG/DL (ref 70–110)
HCT VFR BLD AUTO: 41 % (ref 37–48.5)
HGB BLD-MCNC: 13.5 G/DL (ref 12–16)
IMM GRANULOCYTES # BLD AUTO: 0.01 K/UL (ref 0–0.04)
IMM GRANULOCYTES NFR BLD AUTO: 0.2 % (ref 0–0.5)
LYMPHOCYTES # BLD AUTO: 2.7 K/UL (ref 1–4.8)
LYMPHOCYTES NFR BLD: 46.1 % (ref 18–48)
MCH RBC QN AUTO: 28.1 PG (ref 27–31)
MCHC RBC AUTO-ENTMCNC: 32.9 G/DL (ref 32–36)
MCV RBC AUTO: 85 FL (ref 82–98)
MONOCYTES # BLD AUTO: 0.4 K/UL (ref 0.3–1)
MONOCYTES NFR BLD: 7.1 % (ref 4–15)
NEUTROPHILS # BLD AUTO: 2.6 K/UL (ref 1.8–7.7)
NEUTROPHILS NFR BLD: 43.4 % (ref 38–73)
NRBC BLD-RTO: 0 /100 WBC
PLATELET # BLD AUTO: 216 K/UL (ref 150–450)
PMV BLD AUTO: 12.2 FL (ref 9.2–12.9)
POTASSIUM SERPL-SCNC: 4.1 MMOL/L (ref 3.5–5.1)
PROT SERPL-MCNC: 7.9 G/DL (ref 6–8.4)
PROT UR-MCNC: <7 MG/DL (ref 0–15)
PROT/CREAT UR: NORMAL MG/G{CREAT} (ref 0–0.2)
RBC # BLD AUTO: 4.81 M/UL (ref 4–5.4)
SODIUM SERPL-SCNC: 138 MMOL/L (ref 136–145)
WBC # BLD AUTO: 5.92 K/UL (ref 3.9–12.7)

## 2024-06-25 PROCEDURE — 85025 COMPLETE CBC W/AUTO DIFF WBC: CPT | Performed by: INTERNAL MEDICINE

## 2024-06-25 PROCEDURE — 36415 COLL VENOUS BLD VENIPUNCTURE: CPT | Mod: PO | Performed by: INTERNAL MEDICINE

## 2024-06-25 PROCEDURE — 84156 ASSAY OF PROTEIN URINE: CPT | Performed by: INTERNAL MEDICINE

## 2024-06-25 PROCEDURE — 80053 COMPREHEN METABOLIC PANEL: CPT | Performed by: INTERNAL MEDICINE

## 2024-07-02 ENCOUNTER — OFFICE VISIT (OUTPATIENT)
Dept: RHEUMATOLOGY | Facility: CLINIC | Age: 34
End: 2024-07-02
Payer: COMMERCIAL

## 2024-07-02 VITALS
HEART RATE: 80 BPM | BODY MASS INDEX: 28.36 KG/M2 | WEIGHT: 154.13 LBS | SYSTOLIC BLOOD PRESSURE: 123 MMHG | DIASTOLIC BLOOD PRESSURE: 78 MMHG | HEIGHT: 62 IN

## 2024-07-02 DIAGNOSIS — M25.562 CHRONIC PAIN OF BOTH KNEES: ICD-10-CM

## 2024-07-02 DIAGNOSIS — R53.82 CHRONIC FATIGUE: ICD-10-CM

## 2024-07-02 DIAGNOSIS — M54.50 CHRONIC LOW BACK PAIN, UNSPECIFIED BACK PAIN LATERALITY, UNSPECIFIED WHETHER SCIATICA PRESENT: ICD-10-CM

## 2024-07-02 DIAGNOSIS — R76.8 ANA POSITIVE: Primary | ICD-10-CM

## 2024-07-02 DIAGNOSIS — G89.29 CHRONIC LOW BACK PAIN, UNSPECIFIED BACK PAIN LATERALITY, UNSPECIFIED WHETHER SCIATICA PRESENT: ICD-10-CM

## 2024-07-02 DIAGNOSIS — M25.561 CHRONIC PAIN OF BOTH KNEES: ICD-10-CM

## 2024-07-02 DIAGNOSIS — G89.29 CHRONIC PAIN OF BOTH KNEES: ICD-10-CM

## 2024-07-02 DIAGNOSIS — L21.9 SEBORRHEIC DERMATITIS OF SCALP: ICD-10-CM

## 2024-07-02 DIAGNOSIS — M79.18 MYOFASCIAL PAIN: ICD-10-CM

## 2024-07-02 PROCEDURE — 3074F SYST BP LT 130 MM HG: CPT | Mod: CPTII,S$GLB,, | Performed by: INTERNAL MEDICINE

## 2024-07-02 PROCEDURE — 3008F BODY MASS INDEX DOCD: CPT | Mod: CPTII,S$GLB,, | Performed by: INTERNAL MEDICINE

## 2024-07-02 PROCEDURE — 3078F DIAST BP <80 MM HG: CPT | Mod: CPTII,S$GLB,, | Performed by: INTERNAL MEDICINE

## 2024-07-02 PROCEDURE — 99214 OFFICE O/P EST MOD 30 MIN: CPT | Mod: S$GLB,,, | Performed by: INTERNAL MEDICINE

## 2024-07-02 PROCEDURE — 99999 PR PBB SHADOW E&M-EST. PATIENT-LVL III: CPT | Mod: PBBFAC,,, | Performed by: INTERNAL MEDICINE

## 2024-07-02 PROCEDURE — 1159F MED LIST DOCD IN RCRD: CPT | Mod: CPTII,S$GLB,, | Performed by: INTERNAL MEDICINE

## 2024-12-05 ENCOUNTER — HOSPITAL ENCOUNTER (OUTPATIENT)
Dept: RADIOLOGY | Facility: HOSPITAL | Age: 34
Discharge: HOME | End: 2024-12-05
Attending: INTERNAL MEDICINE
Payer: COMMERCIAL

## 2024-12-05 DIAGNOSIS — R10.12 LEFT UPPER QUADRANT ABDOMINAL PAIN: ICD-10-CM

## 2024-12-05 PROCEDURE — 74176 CT ABD & PELVIS W/O CONTRAST: CPT | Mod: 26,,, | Performed by: RADIOLOGY

## 2024-12-05 PROCEDURE — 74176 CT ABD & PELVIS W/O CONTRAST: CPT | Mod: TC,PN

## 2024-12-23 ENCOUNTER — HOSPITAL ENCOUNTER (OUTPATIENT)
Dept: RADIOLOGY | Facility: HOSPITAL | Age: 34
Discharge: HOME | End: 2024-12-23
Attending: INTERNAL MEDICINE
Payer: COMMERCIAL

## 2024-12-23 DIAGNOSIS — K80.20 CALCULUS OF GALLBLADDER WITHOUT CHOLECYSTITIS WITHOUT OBSTRUCTION: ICD-10-CM

## 2024-12-23 DIAGNOSIS — R10.12 LEFT UPPER QUADRANT ABDOMINAL PAIN: ICD-10-CM

## 2024-12-23 PROCEDURE — 76700 US EXAM ABDOM COMPLETE: CPT | Mod: TC,PN

## 2024-12-23 PROCEDURE — 76700 US EXAM ABDOM COMPLETE: CPT | Mod: 26,,, | Performed by: RADIOLOGY

## 2024-12-25 NOTE — PROGRESS NOTES
The abdominal ultrasound is normal.  How is the pain?  There is a small stone of the gall bladder. Is the pain worse with eating?

## (undated) DEVICE — SEE MEDLINE ITEM 152622

## (undated) DEVICE — SYR B-D DISP CONTROL 10CC100/C

## (undated) DEVICE — BLADE SHAVER 4MM 60D STD SERR

## (undated) DEVICE — SEE MEDLINE ITEM 157166

## (undated) DEVICE — NDL SPINAL SPINOCAN 22GX3.5

## (undated) DEVICE — SPONGE PATTY SURGICAL .5X3IN

## (undated) DEVICE — COVER OVERHEAD SURG LT BLUE

## (undated) DEVICE — BLADE SHAVER 4MM60DEG STRAIGHT

## (undated) DEVICE — MANIFOLD 4 PORT

## (undated) DEVICE — SINU-FOAM STAMMBERGER

## (undated) DEVICE — ELECTRODE REM PLYHSV RETURN 9

## (undated) DEVICE — SYR 10CC LUER LOCK

## (undated) DEVICE — SEE MEDLINE ITEM 157027

## (undated) DEVICE — NDL HYPO 27G X 1 1/2

## (undated) DEVICE — TUBESET MULTIDEBRIDER DECLOG

## (undated) DEVICE — GLOVE PROTEXIS HYDROGEL SZ7.5

## (undated) DEVICE — HANDSWITCH SUCTION COAG

## (undated) DEVICE — KIT ANTIFOG